# Patient Record
Sex: FEMALE | Race: WHITE | NOT HISPANIC OR LATINO | Employment: OTHER | URBAN - METROPOLITAN AREA
[De-identification: names, ages, dates, MRNs, and addresses within clinical notes are randomized per-mention and may not be internally consistent; named-entity substitution may affect disease eponyms.]

---

## 2017-01-23 ENCOUNTER — HOSPITAL ENCOUNTER (OUTPATIENT)
Dept: ULTRASOUND IMAGING | Facility: CLINIC | Age: 42
Discharge: HOME | End: 2017-01-23
Attending: UROLOGY | Admitting: UROLOGY
Payer: COMMERCIAL

## 2017-01-23 DIAGNOSIS — R31.9 HEMATURIA: ICD-10-CM

## 2017-01-23 DIAGNOSIS — N13.30 BILATERAL HYDRONEPHROSIS: ICD-10-CM

## 2017-01-23 PROCEDURE — 76770 US EXAM ABDO BACK WALL COMP: CPT

## 2017-01-24 DIAGNOSIS — R07.89 CHEST WALL PAIN: Primary | ICD-10-CM

## 2017-01-24 NOTE — TELEPHONE ENCOUNTER
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    Controlled Substance Refill Request for Xanan  Problem List Complete:  No     PROVIDER TO CONSIDER COMPLETION OF PROBLEM LIST AND OVERVIEW/CONTROLLED SUBSTANCE AGREEMENT    Last Written Prescription Date:  11/13/2015  Last Fill Quantity: 20,   # refills: 0    Last Office Visit with [facility] primary care provider: 11/15/2016    Future Office visit:     Controlled substance agreement on file: No.     Processing:  Fax Rx to [facility] pharmacy   checked in past 6 months?  No, route to CONCETTA Barraza

## 2017-01-25 RX ORDER — ALPRAZOLAM 0.5 MG
0.5 TABLET ORAL 3 TIMES DAILY PRN
Qty: 3 TABLET | Refills: 0 | Status: SHIPPED | OUTPATIENT
Start: 2017-01-25 | End: 2017-02-21

## 2017-02-21 ENCOUNTER — OFFICE VISIT (OUTPATIENT)
Dept: UROLOGY | Facility: CLINIC | Age: 42
End: 2017-02-21
Payer: COMMERCIAL

## 2017-02-21 VITALS
DIASTOLIC BLOOD PRESSURE: 60 MMHG | SYSTOLIC BLOOD PRESSURE: 100 MMHG | WEIGHT: 145 LBS | BODY MASS INDEX: 24.75 KG/M2 | HEIGHT: 64 IN

## 2017-02-21 DIAGNOSIS — N13.30 HYDRONEPHROSIS, UNSPECIFIED HYDRONEPHROSIS TYPE: Primary | ICD-10-CM

## 2017-02-21 PROCEDURE — 99207 ZZC NO CHARGE LOS: CPT | Performed by: UROLOGY

## 2017-02-21 ASSESSMENT — PAIN SCALES - GENERAL: PAINLEVEL: NO PAIN (0)

## 2017-02-21 NOTE — LETTER
2/21/2017       RE: Citlali Adrian  [id number]  [place] [address]     Dear Colleague,    Thank you for referring your patient, Citlali Adrian, to the [facility] [place] at [facility]. Please see a copy of my visit note below.    Had consult via phone with the patient.   Mild hydro, unchanged in u/s.   Patient will follow up in one year with u/s/           Again, thank you for allowing me to participate in the care of your patient.      Sincerely,    Aletha Lorenzo MD

## 2017-02-21 NOTE — MR AVS SNAPSHOT
"              After Visit Summary   2/21/2017    Citlali Adrian    MRN: [id number]           Patient Information     Date Of Birth          1975        Visit Information        Provider Department      2/21/2017 1:00 PM Aletha Lorenzo MD [facility] [place]        Today's Diagnoses     Hydronephrosis, unspecified hydronephrosis type    -  1       Follow-ups after your visit        Follow-up notes from your care team     Return in about 1 year (around 2/21/2018).      Who to contact     If you have questions or need follow up information about today's clinic visit or your schedule please contact [facility] directly at [phone].  Normal or non-critical lab and imaging results will be communicated to you by [employer], letter or phone within 4 business days after the clinic has received the results. If you do not hear from us within 7 days, please contact the clinic through [employer] or phone. If you have a critical or abnormal lab result, we will notify you by phone as soon as possible.  Submit refill requests through [employer] or call your pharmacy and they will forward the refill request to us. Please allow 3 business days for your refill to be completed.          Additional Information About Your Visit        MyChart Information     [employer] gives you secure access to your electronic health record. If you see a primary care provider, you can also send messages to your care team and make appointments. If you have questions, please call your primary care clinic.  If you do not have a primary care provider, please call [phone] and they will assist you.        Care EveryWhere ID     This is your Care EveryWhere ID. This could be used by other organizations to access your [place] medical records  [id number]        Your Vitals Were     Height BMI (Body Mass Index)                1.626 m (5' 4\") 24.89 kg/m2           Blood Pressure " from Last 3 Encounters:   02/21/17 100/60   11/15/16 114/72   02/19/16 104/71    Weight from Last 3 Encounters:   02/21/17 65.8 kg (145 lb)   11/15/16 68.9 kg (152 lb)   11/23/15 66.7 kg (147 lb)              Today, you had the following     No orders found for display       Primary Care Provider Office Phone # Fax #    Kiki Heidi Mariano PA-C [phone]       [address] [address]        Thank you!     Thank you for choosing [facility] [place]  for your care. Our goal is always to provide you with excellent care. Hearing back from our patients is one way we can continue to improve our services. Please take a few minutes to complete the written survey that you may receive in the mail after your visit with us. Thank you!             Your Updated Medication List - Protect others around you: Learn how to safely use, store and throw away your medicines at www.disposemymeds.org.          This list is accurate as of: 2/21/17  5:59 PM.  Always use your most recent med list.                   Brand Name Dispense Instructions for use    clobetasol 0.05 % ointment    TEMOVATE    45 g    Apply sparingly to affected area twice daily as needed.  Do not apply to face.

## 2017-02-21 NOTE — PROGRESS NOTES
Had consult via phone with the patient.   Mild hydro, unchanged in u/s.   Patient will follow up in one year with u/s/

## 2017-04-13 ENCOUNTER — MYC MEDICAL ADVICE (OUTPATIENT)
Dept: FAMILY MEDICINE | Facility: CLINIC | Age: 42
End: 2017-04-13

## 2017-04-13 ENCOUNTER — E-VISIT (OUTPATIENT)
Dept: FAMILY MEDICINE | Facility: CLINIC | Age: 42
End: 2017-04-13
Payer: COMMERCIAL

## 2017-04-13 ENCOUNTER — TELEPHONE (OUTPATIENT)
Dept: FAMILY MEDICINE | Facility: CLINIC | Age: 42
End: 2017-04-13

## 2017-04-13 DIAGNOSIS — J01.00 ACUTE NON-RECURRENT MAXILLARY SINUSITIS: Primary | ICD-10-CM

## 2017-04-13 PROCEDURE — 98969 ZZC NONPHYSICIAN ONLINE ASSESSMENT AND MANAGEMENT: CPT | Performed by: NURSE PRACTITIONER

## 2017-04-13 RX ORDER — FLUTICASONE PROPIONATE 50 MCG
1-2 SPRAY, SUSPENSION (ML) NASAL DAILY
Qty: 1 BOTTLE | Refills: 11 | Status: SHIPPED | OUTPATIENT
Start: 2017-04-13 | End: 2017-04-18

## 2017-04-13 NOTE — TELEPHONE ENCOUNTER
Pt calls, discussed below at length, will initiate E visit  Delmy Claire RN, BSN  Message handled by Nurse Triage.

## 2017-04-13 NOTE — TELEPHONE ENCOUNTER
RN Sinusitis Treatment Protocol: ages 18 and up  Citlali Adrian, a 41 year old female, is having symptoms reviewed for possible sinus infection.    SUBJECTIVE:  Symptoms: Facial pain or pressure over the sinus areas, especially if worse with position change or cough, Nasal discharge/purulent, Nasal congestion, Post nasal drip and present only 5 days     In addition notes: None   Shortness of breath: NO  Onset of symptoms was 5 day(s) ago.    Treatment measures tried include Tylenol only.   Course of illness is worsening.  Predisposing conditions include: None    Complicating Factors and Serious Symptoms:   Patient reports: NONE   Patient denies: NONE    ALLERGIES:   Allergies   Allergen Reactions     No Known Allergies          #  [phone]      Melinda Beaver RN

## 2017-04-13 NOTE — TELEPHONE ENCOUNTER
Please call pt back.   I recommend a visit--either office visit/urgent care, Evisit through MyChart or Zipnosis.   Patient likely needs antibiotics.     Kiki Mariano PA-C

## 2017-04-18 ENCOUNTER — OFFICE VISIT (OUTPATIENT)
Dept: FAMILY MEDICINE | Facility: CLINIC | Age: 42
End: 2017-04-18
Payer: COMMERCIAL

## 2017-04-18 VITALS
DIASTOLIC BLOOD PRESSURE: 76 MMHG | SYSTOLIC BLOOD PRESSURE: 118 MMHG | TEMPERATURE: 97.8 F | BODY MASS INDEX: 25.95 KG/M2 | OXYGEN SATURATION: 98 % | RESPIRATION RATE: 14 BRPM | HEART RATE: 68 BPM | WEIGHT: 152 LBS | HEIGHT: 64 IN

## 2017-04-18 DIAGNOSIS — J01.90 ACUTE SINUSITIS WITH SYMPTOMS > 10 DAYS: Primary | ICD-10-CM

## 2017-04-18 PROCEDURE — 99213 OFFICE O/P EST LOW 20 MIN: CPT | Performed by: NURSE PRACTITIONER

## 2017-04-18 RX ORDER — CEFDINIR 300 MG/1
300 CAPSULE ORAL 2 TIMES DAILY
Qty: 20 CAPSULE | Refills: 0 | Status: SHIPPED | OUTPATIENT
Start: 2017-04-18 | End: 2017-04-28

## 2017-04-18 NOTE — PATIENT INSTRUCTIONS
Stop Augmentin.  Start Cefdinir (Omnicef) twice daily x 10 days.  Increase fluids.  Okay to use Sudafed as needed for congestion.

## 2017-04-18 NOTE — NURSING NOTE
"Chief Complaint   Patient presents with     Sinus Problem       Initial /76 (BP Location: Right arm, Cuff Size: Adult Regular)  Pulse 68  Temp 97.8  F (36.6  C) (Oral)  Resp 14  Ht 5' 4\" (1.626 m)  Wt 152 lb (68.9 kg)  LMP 03/21/2017 (Approximate)  SpO2 98%  BMI 26.09 kg/m2 Estimated body mass index is 26.09 kg/(m^2) as calculated from the following:    Height as of this encounter: 5' 4\" (1.626 m).    Weight as of this encounter: 152 lb (68.9 kg).  Medication Reconciliation: complete   Hilaria Grant MA    "

## 2017-04-18 NOTE — PROGRESS NOTES
HPI    SUBJECTIVE:                                                    Citlali Adrian is a 41 year old female who presents to clinic today for the following health issues:      Acute Illness   Acute illness concerns: sinus problems   Onset: started 10 days ago. Had an e-visit on 4/13/17    Fever: no    Chills/Sweats: YES- sweats    Headache (location?): YES    Sinus Pressure:YES    Conjunctivitis:  no    Ear Pain: YES: both ears. Pressure    Rhinorrhea: no-eyes,nose, mouth feel very dry now.     Congestion: YES    Sore Throat: no     Cough: sometimes    Wheeze: no    Decreased Appetite: no    Nausea: no    Vomiting: no    Diarrhea:  no    Dysuria/Freq.: no    Fatigue/Achiness: no    Sick/Strep Exposure:  had the flu 4/6/17     Therapies Tried and outcome: augmentin from e-visit.     Continues to feel congested.  Nasal drainage has decreased a lot.  Feeling some better, but feels her symptoms should be improved more at this point on antibiotics.  Not using flonase; does not like nasal sprays.  Always been treated with zithromax for sinusitis and had good relief.  Would like a z-na.           Problem list and histories reviewed & adjusted, as indicated.  Additional history: as documented.    Current Outpatient Prescriptions   Medication Sig Dispense Refill     fluconazole (DIFLUCAN) 150 MG tablet Take 1 tablet (150 mg) by mouth every 3 days 4 tablet 0     amoxicillin-clavulanate (AUGMENTIN) 875-125 MG per tablet Take 1 tablet by mouth 2 times daily 20 tablet 0     clobetasol (TEMOVATE) 0.05 % ointment Apply sparingly to affected area twice daily as needed.  Do not apply to face. 45 g 1     Allergies   Allergen Reactions     No Known Allergies        Reviewed and updated as needed this visit by clinical staff  Tobacco  Allergies  Meds  Problems  Med Hx  Soc Hx      Reviewed and updated as needed this visit by Provider         ROS:  Constitutional, HEENT, cardiovascular, pulmonary, gi and gu systems are  "negative, except as otherwise noted.    OBJECTIVE:                                                    /76 (BP Location: Right arm, Cuff Size: Adult Regular)  Pulse 68  Temp 97.8  F (36.6  C) (Oral)  Resp 14  Ht 5' 4\" (1.626 m)  Wt 152 lb (68.9 kg)  LMP 03/21/2017 (Approximate)  SpO2 98%  BMI 26.09 kg/m2  Body mass index is 26.09 kg/(m^2).  GENERAL: healthy, alert and no distress  HENT: ear canals and TM's normal, nasal mucosa erythematous and moderately congested, and mouth without ulcers or lesions, no sinus tenderness  NECK: no adenopathy, no asymmetry, masses, or scars and thyroid normal to palpation  RESP: lungs clear to auscultation - no rales, rhonchi or wheezes  CV: regular rate and rhythm, normal S1 S2, no S3 or S4, no murmur, click or rub, no peripheral edema and peripheral pulses strong  SKIN: no suspicious lesions or rashes  PSYCH: mentation appears normal, affect normal/bright    Diagnostic Test Results:  none      ASSESSMENT/PLAN:                                                    1. Acute sinusitis with symptoms > 10 days  Discussed medications at length.  Will stop augmentin and start cefdinir.  OTC decongestants as needed.  If no improvement in 3-5 days or worsening symptoms return for follow up.    - cefdinir (OMNICEF) 300 MG capsule; Take 1 capsule (300 mg) by mouth 2 times daily for 10 days  Dispense: 20 capsule; Refill: 0    - She is aware she is due for a pap; will make appt.         ANN Chapman [facility]      Physical Exam      "

## 2017-04-18 NOTE — MR AVS SNAPSHOT
After Visit Summary   4/18/2017    Citlali Adrian    MRN: [id number]           Patient Information     Date Of Birth          1975        Visit Information        Provider Department      4/18/2017 3:40 PM Janeth Rodriguez APRN CNP [facility]        Today's Diagnoses     Acute sinusitis with symptoms > 10 days    -  1      Care Instructions    Stop Augmentin.  Start Cefdinir (Omnicef) twice daily x 10 days.  Increase fluids.  Okay to use Sudafed as needed for congestion.          Follow-ups after your visit        Who to contact     If you have questions or need follow up information about today's clinic visit or your schedule please contact [facility] directly at [phone].  Normal or non-critical lab and imaging results will be communicated to you by [employer], letter or phone within 4 business days after the clinic has received the results. If you do not hear from us within 7 days, please contact the clinic through [employer] or phone. If you have a critical or abnormal lab result, we will notify you by phone as soon as possible.  Submit refill requests through [employer] or call your pharmacy and they will forward the refill request to us. Please allow 3 business days for your refill to be completed.          Additional Information About Your Visit        MyChart Information     [employer] gives you secure access to your electronic health record. If you see a primary care provider, you can also send messages to your care team and make appointments. If you have questions, please call your primary care clinic.  If you do not have a primary care provider, please call [phone] and they will assist you.        Care EveryWhere ID     This is your Care EveryWhere ID. This could be used by other organizations to access your [place] medical records  [id number]        Your Vitals Were     Pulse Temperature Respirations Height Last Period Pulse Oximetry    68 97.8  F  "(36.6  C) (Oral) 14 5' 4\" (1.626 m) 03/21/2017 (Approximate) 98%    BMI (Body Mass Index)                   26.09 kg/m2            Blood Pressure from Last 3 Encounters:   04/18/17 118/76   02/21/17 100/60   11/15/16 114/72    Weight from Last 3 Encounters:   04/18/17 152 lb (68.9 kg)   02/21/17 145 lb (65.8 kg)   11/15/16 152 lb (68.9 kg)              Today, you had the following     No orders found for display         Today's Medication Changes          These changes are accurate as of: 4/18/17  4:26 PM.  If you have any questions, ask your nurse or doctor.               Start taking these medicines.        Dose/Directions    cefdinir 300 MG capsule   Commonly known as:  OMNICEF   Used for:  Acute sinusitis with symptoms > 10 days   Started by:  Janeth Rodriguez APRN CNP        Dose:  300 mg   Take 1 capsule (300 mg) by mouth 2 times daily for 10 days   Quantity:  20 capsule   Refills:  0            Where to get your medicines      These medications were sent to [id number] [address] [place], [place] [address]     Phone:  [phone]     cefdinir 300 MG capsule                Primary Care Provider Office Phone # Fax #    Susan Rachele Haase, APRN [phone]       [address] [address]        Thank you!     Thank you for choosing [facility]  for your care. Our goal is always to provide you with excellent care. Hearing back from our patients is one way we can continue to improve our services. Please take a few minutes to complete the written survey that you may receive in the mail after your visit with us. Thank you!             Your Updated Medication List - Protect others around you: Learn how to safely use, store and throw away your medicines at www.disposemymeds.org.          This list is accurate as of: 4/18/17  4:26 PM.  Always use your most recent med list.                   " Brand Name Dispense Instructions for use    amoxicillin-clavulanate 875-125 MG per tablet    AUGMENTIN    20 tablet    Take 1 tablet by mouth 2 times daily       cefdinir 300 MG capsule    OMNICEF    20 capsule    Take 1 capsule (300 mg) by mouth 2 times daily for 10 days       clobetasol 0.05 % ointment    TEMOVATE    45 g    Apply sparingly to affected area twice daily as needed.  Do not apply to face.       fluconazole 150 MG tablet    DIFLUCAN    4 tablet    Take 1 tablet (150 mg) by mouth every 3 days

## 2017-04-21 ENCOUNTER — TELEPHONE (OUTPATIENT)
Dept: FAMILY MEDICINE | Facility: CLINIC | Age: 42
End: 2017-04-21

## 2017-04-28 ENCOUNTER — TRANSFERRED RECORDS (OUTPATIENT)
Dept: HEALTH INFORMATION MANAGEMENT | Facility: CLINIC | Age: 42
End: 2017-04-28

## 2017-04-28 LAB — PAP SMEAR - HIM PATIENT REPORTED: NEGATIVE

## 2017-05-23 PROBLEM — N13.30 HYDRONEPHROSIS, UNSPECIFIED HYDRONEPHROSIS TYPE: Status: ACTIVE | Noted: 2017-05-23

## 2017-05-24 ENCOUNTER — OFFICE VISIT (OUTPATIENT)
Dept: FAMILY MEDICINE | Facility: CLINIC | Age: 42
End: 2017-05-24
Payer: COMMERCIAL

## 2017-05-24 VITALS
DIASTOLIC BLOOD PRESSURE: 70 MMHG | BODY MASS INDEX: 25.78 KG/M2 | WEIGHT: 151 LBS | HEART RATE: 82 BPM | RESPIRATION RATE: 12 BRPM | HEIGHT: 64 IN | OXYGEN SATURATION: 98 % | SYSTOLIC BLOOD PRESSURE: 104 MMHG | TEMPERATURE: 98.2 F

## 2017-05-24 DIAGNOSIS — Z00.00 ROUTINE GENERAL MEDICAL EXAMINATION AT A HEALTH CARE FACILITY: Primary | ICD-10-CM

## 2017-05-24 DIAGNOSIS — Z13.6 CARDIOVASCULAR SCREENING; LDL GOAL LESS THAN 160: ICD-10-CM

## 2017-05-24 LAB
BASOPHILS # BLD AUTO: 0 10E9/L (ref 0–0.2)
BASOPHILS NFR BLD AUTO: 0.5 %
DIFFERENTIAL METHOD BLD: NORMAL
EOSINOPHIL # BLD AUTO: 0.1 10E9/L (ref 0–0.7)
EOSINOPHIL NFR BLD AUTO: 1.5 %
ERYTHROCYTE [DISTWIDTH] IN BLOOD BY AUTOMATED COUNT: 13.1 % (ref 10–15)
HCT VFR BLD AUTO: 37.1 % (ref 35–47)
HGB BLD-MCNC: 12.5 G/DL (ref 11.7–15.7)
LYMPHOCYTES # BLD AUTO: 1.3 10E9/L (ref 0.8–5.3)
LYMPHOCYTES NFR BLD AUTO: 21.1 %
MCH RBC QN AUTO: 29.6 PG (ref 26.5–33)
MCHC RBC AUTO-ENTMCNC: 33.7 G/DL (ref 31.5–36.5)
MCV RBC AUTO: 88 FL (ref 78–100)
MONOCYTES # BLD AUTO: 0.4 10E9/L (ref 0–1.3)
MONOCYTES NFR BLD AUTO: 7 %
NEUTROPHILS # BLD AUTO: 4.3 10E9/L (ref 1.6–8.3)
NEUTROPHILS NFR BLD AUTO: 69.9 %
PLATELET # BLD AUTO: 282 10E9/L (ref 150–450)
RBC # BLD AUTO: 4.22 10E12/L (ref 3.8–5.2)
WBC # BLD AUTO: 6.1 10E9/L (ref 4–11)

## 2017-05-24 PROCEDURE — 90471 IMMUNIZATION ADMIN: CPT | Performed by: NURSE PRACTITIONER

## 2017-05-24 PROCEDURE — 80061 LIPID PANEL: CPT | Performed by: NURSE PRACTITIONER

## 2017-05-24 PROCEDURE — 80050 GENERAL HEALTH PANEL: CPT | Performed by: NURSE PRACTITIONER

## 2017-05-24 PROCEDURE — 83550 IRON BINDING TEST: CPT | Performed by: NURSE PRACTITIONER

## 2017-05-24 PROCEDURE — 90715 TDAP VACCINE 7 YRS/> IM: CPT | Performed by: NURSE PRACTITIONER

## 2017-05-24 PROCEDURE — 82306 VITAMIN D 25 HYDROXY: CPT | Performed by: NURSE PRACTITIONER

## 2017-05-24 PROCEDURE — 99396 PREV VISIT EST AGE 40-64: CPT | Mod: 25 | Performed by: NURSE PRACTITIONER

## 2017-05-24 PROCEDURE — 36415 COLL VENOUS BLD VENIPUNCTURE: CPT | Performed by: NURSE PRACTITIONER

## 2017-05-24 PROCEDURE — 83540 ASSAY OF IRON: CPT | Performed by: NURSE PRACTITIONER

## 2017-05-24 NOTE — NURSING NOTE
"Chief Complaint   Patient presents with     Physical       Initial /70 (BP Location: Left arm, Patient Position: Chair, Cuff Size: Adult Regular)  Pulse 82  Temp 98.2  F (36.8  C) (Oral)  Resp 12  Ht 5' 4\" (1.626 m)  Wt 151 lb (68.5 kg)  SpO2 98%  BMI 25.92 kg/m2 Estimated body mass index is 25.92 kg/(m^2) as calculated from the following:    Height as of this encounter: 5' 4\" (1.626 m).    Weight as of this encounter: 151 lb (68.5 kg).  Medication Reconciliation: complete   Keily Bullock CMA      "

## 2017-05-24 NOTE — Clinical Note
Please abstract the following data from this visit with this patient into the appropriate field in Epic:  Pap smear done on this date: 4/28/2017 (approximately), by this group: [facility], results were normal.

## 2017-05-24 NOTE — MR AVS SNAPSHOT
After Visit Summary   5/24/2017    Citlali Adrian    MRN: [id number]           Patient Information     Date Of Birth          1975        Visit Information        Provider Department      5/24/2017 8:00 AM Haase, Susan Rachele, APRN [facility]        Today's Diagnoses     Routine general medical examination at a health care facility    -  1    CARDIOVASCULAR SCREENING; LDL GOAL LESS THAN 160          Care Instructions      Preventive Health Recommendations  Female Ages 40 to 49    Yearly exam:     See your health care provider every year in order to  1. Review health changes.   2. Discuss preventive care.    3. Review your medicines if your doctor prescribed any.      Get a Pap test every three years (unless you have an abnormal result and your provider advises testing more often).      If you get Pap tests with HPV test, you only need to test every 5 years, unless you have an abnormal result. You do not need a Pap test if your uterus was removed (hysterectomy) and you have not had cancer.      You should be tested each year for STDs (sexually transmitted diseases), if you're at risk.       Ask your doctor if you should have a mammogram.      Have a colonoscopy (test for colon cancer) if someone in your family has had colon cancer or polyps before age 50.       Have a cholesterol test every 5 years.       Have a diabetes test (fasting glucose) after age 45. If you are at risk for diabetes, you should have this test every 3 years.    Shots: Get a flu shot each year. Get a tetanus shot every 10 years.     Nutrition:     Eat at least 5 servings of fruits and vegetables each day.    Eat whole-grain bread, whole-wheat pasta and brown rice instead of white grains and rice.    Talk to your provider about Calcium and Vitamin D.     Lifestyle    Exercise at least 150 minutes a week (an average of 30 minutes a day, 5 days a week). This will help you control your weight and prevent  disease.    Limit alcohol to one drink per day.    No smoking.     Wear sunscreen to prevent skin cancer.    See your dentist every six months for an exam and cleaning.          Follow-ups after your visit        Follow-up notes from your care team     Return in about 1 year (around 5/24/2018) for Physical Exam.      Your next 10 appointments already scheduled     Jul 24, 2017 12:30 PM CDT   MA SCREENING DIGITAL BILATERAL with [id number]   [facility] ([facility])    [address] [facility], [address]  [facility] [id number] [phone]           Do not use any powder, lotion or deodorant under your arms or on your breast. If you do, we will ask you to remove it before your exam.  Wear comfortable, two-piece clothing.  If you have any allergies, tell your care team.  Bring any previous mammograms from other facilities or have them mailed to the breast center. This mammogram location, [facility], now offers 3D mammography. It doesn't replace a screening mammogram and can be done with a regular screening mammogram. It is optional and not all insurances will pay for it. 3D mammography is a special kind of mammogram that produces a three-dimensional image of the breast by using low dose-xrays. 3D allows the radiologist to see the breast tissue differently from 2D, which reduces the chance of repeat testing due to overlapping breast tissue. If you are interested in have a 3D mammogram, please check with your insurance before you arrive for your exam. On the day of your exam you will be asked if you would like 3D imaging.              Who to contact     If you have questions or need follow up information about today's clinic visit or your schedule please contact [facility] directly at [phone].  Normal or non-critical lab and imaging results will be communicated to you by MyChart, letter or phone within 4 business days after the clinic has received the  "results. If you do not hear from us within 7 days, please contact the clinic through [employer] or phone. If you have a critical or abnormal lab result, we will notify you by phone as soon as possible.  Submit refill requests through [employer] or call your pharmacy and they will forward the refill request to us. Please allow 3 business days for your refill to be completed.          Additional Information About Your Visit        [employer] Information     [employer] gives you secure access to your electronic health record. If you see a primary care provider, you can also send messages to your care team and make appointments. If you have questions, please call your primary care clinic.  If you do not have a primary care provider, please call [phone] and they will assist you.        Care EveryWhere ID     This is your Care EveryWhere ID. This could be used by other organizations to access your [place] medical records  [id number]        Your Vitals Were     Pulse Temperature Respirations Height Pulse Oximetry BMI (Body Mass Index)    82 98.2  F (36.8  C) (Oral) 12 5' 4\" (1.626 m) 98% 25.92 kg/m2       Blood Pressure from Last 3 Encounters:   05/24/17 104/70   04/18/17 118/76   02/21/17 100/60    Weight from Last 3 Encounters:   05/24/17 151 lb (68.5 kg)   04/18/17 152 lb (68.9 kg)   02/21/17 145 lb (65.8 kg)              We Performed the Following     CBC with platelets differential     Comprehensive metabolic panel     Iron and iron binding capacity     Lipid panel reflex to direct LDL     TDAP VACCINE (ADACEL)     TSH with free T4 reflex     Vitamin D Deficiency        Primary Care Provider Office Phone # Fax #    Susan Rachele Haase, APRN [phone]       [facility] [id number] [place] [id number]        Thank you!     Thank you for choosing [facility]  for your care. Our goal is always to provide you with excellent care. Hearing back from our patients is " one way we can continue to improve our services. Please take a few minutes to complete the written survey that you may receive in the mail after your visit with us. Thank you!             Your Updated Medication List - Protect others around you: Learn how to safely use, store and throw away your medicines at www.disposemymeds.org.          This list is accurate as of: 5/24/17  8:30 AM.  Always use your most recent med list.                   Brand Name Dispense Instructions for use    clobetasol 0.05 % ointment    TEMOVATE    45 g    Apply sparingly to affected area twice daily as needed.  Do not apply to face.

## 2017-05-24 NOTE — PROGRESS NOTES
"   SUBJECTIVE:     CC: Citlali Adrian is an 41 year old woman who presents for preventive health visit.     Healthy Habits:    Do you get at least three servings of calcium containing foods daily (dairy, green leafy vegetables, etc.)? yes    Amount of exercise or daily activities, outside of work: 3-4 day(s) per week    Problems taking medications regularly No    Medication side effects: No    Have you had an eye exam in the past two years? yes    Do you see a dentist twice per year? Yes    Do you have sleep apnea, excessive snoring or daytime drowsiness?no    Health Maintenance -- Will update Tetanus today.       Mammogram -- last in 7/2016 was normal, FHx of breast cancer (MGMo), recommended every 1-2 years - due by 7/2018    Pap Smear -- last on 4/28/2017 completed by [facility], patient reported normal results, recommended every 3 years - due 4/2020   Colonoscopy -- last in 5/2011  Psoriasis -- No concerns at this time. Followed by dermatology. Using clobetasol (0.05%) ointment prn. Of note, patient reports that she has a mole removed from L wrist in the past and biopsy results suggest borderline for melanoma. Has annual skin checks by dermatology    Pain of L Anterior Chest -- Intermittent pain of L side of anterior chest. Located inferior to L clavicle and superior to L breast. Describes as sharp and feels like musculature \"catches\" deep to L breast tissue. States that her pain is not related to or worsened by inhalation/exhalation. Denies SOB, heart burn, palpitations, or breast tenderness.    Hydronephrosis -- Followed by urology yearly.    Today's PHQ-2 Score:   PHQ-2 ( 1999 Pfizer) 5/24/2017 11/15/2016   Q1: Little interest or pleasure in doing things 0 0   Q2: Feeling down, depressed or hopeless 0 0   PHQ-2 Score 0 0     Abuse: Current or Past(Physical, Sexual or Emotional)- No  Do you feel safe in your environment - Yes    Social History   Substance Use Topics     Smoking status: Never Smoker     " "Smokeless tobacco: Never Used     Alcohol use Yes      Comment: socially     The patient does not drink >3 drinks per day nor >7 drinks per week.    Recent Labs   Lab Test  06/14/14   0759  02/20/13   0908   CHOL  121  126   HDL  44*  46*   LDL  57  62   TRIG  98  88   CHOLHDLRATIO  2.7  2.7     Reviewed orders with patient.  Reviewed health maintenance and updated orders accordingly - Yes    Mammo Decision Support:  Patient under age 50, mutual decision reflected in health maintenance.    Pertinent mammograms are reviewed under the imaging tab.  History of abnormal Pap smear: NO - age 30- 65 PAP every 3 years recommended    Reviewed and updated as needed this visit by clinical staff  Tobacco  Allergies  Med Hx  Surg Hx  Fam Hx  Soc Hx      Reviewed and updated as needed this visit by Provider    Past Medical History:   Diagnosis Date     Microscopic Hematuria     neg IVP 98 and neg US 97     Psoriasis     mild      ROS:  Constitutional, HEENT, cardiovascular, pulmonary, GI, , musculoskeletal, neuro, skin, endocrine and psych systems are negative, except as in HPI or otherwise noted     This document serves as a record of the services and decisions personally performed and made by Susan Haase, CNP. It was created on her behalf by Buffy Gurrola, a trained medical scribe. The creation of this document is based the provider's statements to the medical scribe.  Buffy Gurrola May 24, 2017 8:22 AM     Problem list, Medication list, Allergies, and Medical/Social/Surgical histories reviewed in [facility] and updated as appropriate.  OBJECTIVE:     /70 (BP Location: Left arm, Patient Position: Chair, Cuff Size: Adult Regular)  Pulse 82  Temp 98.2  F (36.8  C) (Oral)  Resp 12  Ht 1.626 m (5' 4\")  Wt 68.5 kg (151 lb)  SpO2 98%  BMI 25.92 kg/m2  EXAM:  GENERAL: healthy, alert and no distress  EYES: Eyes grossly normal to inspection  HENT: ear canals and TM's normal, nose and mouth without ulcers or " "lesions  NECK: no adenopathy, no asymmetry, masses, or scars and thyroid normal to palpation  RESP: lungs clear to auscultation - no rales, rhonchi or wheezes  BREAST: defer, followed by GYN  CV: regular rate and rhythm, normal S1 S2, no S3 or S4, no murmur, click or rub, no peripheral edema  ABDOMEN: soft, nontender, no hepatosplenomegaly, no masses and bowel sounds normal  MS: no gross musculoskeletal defects noted, no edema  SKIN: no suspicious lesions or rashes  NEURO: Normal strength and tone, mentation intact and speech normal  PSYCH: mentation appears normal, affect normal/bright    ASSESSMENT/PLAN:     Citlali was seen today for physical.    Diagnoses and all orders for this visit:    Routine general medical examination at a health care facility  -     TDAP VACCINE (ADACEL)  -     CBC with platelets differential  -     Comprehensive metabolic panel  -     -     TSH with free T4 reflex  -     Vitamin D Deficiency  -     Iron and iron binding capacity    CARDIOVASCULAR SCREENING; LDL GOAL LESS THAN 160  Lipid panel reflex to direct LDL    COUNSELING:   Reviewed preventive health counseling, as reflected in patient instructions     reports that she has never smoked. She has never used smokeless tobacco.    Estimated body mass index is 25.92 kg/(m^2) as calculated from the following:    Height as of this encounter: 1.626 m (5' 4\").    Weight as of this encounter: 68.5 kg (151 lb).     Counseling Resources:  ATP IV Guidelines  Pooled Cohorts Equation Calculator  Breast Cancer Risk Calculator  FRAX Risk Assessment  ICSI Preventive Guidelines  Dietary Guidelines for Americans, 2010  USDA's MyPlate  ASA Prophylaxis  Lung CA Screening  Follow up in 1 year, sooner as needed.  The information in this document, created by the medical scribe for me, accurately reflects the services I personally performed and the decisions made by me. I have reviewed and approved this document for accuracy.   Susan Haase, APRN [facility] " [facility]

## 2017-05-24 NOTE — PROGRESS NOTES
Des Godwin,  Your CBC (checks for anemia and infection) was normal.  Sincerely,     Susan Haase, CNP

## 2017-05-25 LAB
ALBUMIN SERPL-MCNC: 4.3 G/DL (ref 3.4–5)
ALP SERPL-CCNC: 50 U/L (ref 40–150)
ALT SERPL W P-5'-P-CCNC: 18 U/L (ref 0–50)
ANION GAP SERPL CALCULATED.3IONS-SCNC: 9 MMOL/L (ref 3–14)
AST SERPL W P-5'-P-CCNC: 11 U/L (ref 0–45)
BILIRUB SERPL-MCNC: 0.5 MG/DL (ref 0.2–1.3)
BUN SERPL-MCNC: 14 MG/DL (ref 7–30)
CALCIUM SERPL-MCNC: 9.6 MG/DL (ref 8.5–10.1)
CHLORIDE SERPL-SCNC: 106 MMOL/L (ref 94–109)
CHOLEST SERPL-MCNC: 155 MG/DL
CO2 SERPL-SCNC: 25 MMOL/L (ref 20–32)
CREAT SERPL-MCNC: 0.68 MG/DL (ref 0.52–1.04)
DEPRECATED CALCIDIOL+CALCIFEROL SERPL-MC: 30 UG/L (ref 20–75)
GFR SERPL CREATININE-BSD FRML MDRD: NORMAL ML/MIN/1.7M2
GLUCOSE SERPL-MCNC: 92 MG/DL (ref 70–99)
HDLC SERPL-MCNC: 54 MG/DL
IRON SATN MFR SERPL: 23 % (ref 15–46)
IRON SERPL-MCNC: 71 UG/DL (ref 35–180)
LDLC SERPL CALC-MCNC: 84 MG/DL
NONHDLC SERPL-MCNC: 101 MG/DL
POTASSIUM SERPL-SCNC: 4 MMOL/L (ref 3.4–5.3)
PROT SERPL-MCNC: 7.5 G/DL (ref 6.8–8.8)
SODIUM SERPL-SCNC: 140 MMOL/L (ref 133–144)
TIBC SERPL-MCNC: 308 UG/DL (ref 240–430)
TRIGL SERPL-MCNC: 83 MG/DL
TSH SERPL DL<=0.005 MIU/L-ACNC: 1.97 MU/L (ref 0.4–4)

## 2017-05-25 NOTE — PROGRESS NOTES
Des Godwin,  Your lab results are as below:  1)  TSH (thyroid level) 1.97 which is normal (range 0.4-5)  2)  Cholesterol was normal at 155,  your LDL (bad cholesterol) and your HDL (good cholesterol) were also within normal range. Continue to follow a low cholesterol diet and we will recheck this in 1 year.  3)  Glucose was normal at 92 (normal fasting is <100).  4)  Iron levels were normal.  5)  Vitamin D level was normal at 30.      If you have any questions do not hesitate to call the clinic to discuss the results with me further.     Sincerely,    Susan Haase, CNP

## 2017-06-07 ENCOUNTER — OFFICE VISIT (OUTPATIENT)
Dept: FAMILY MEDICINE | Facility: CLINIC | Age: 42
End: 2017-06-07
Payer: COMMERCIAL

## 2017-06-07 VITALS
RESPIRATION RATE: 18 BRPM | HEART RATE: 92 BPM | BODY MASS INDEX: 25.23 KG/M2 | SYSTOLIC BLOOD PRESSURE: 120 MMHG | OXYGEN SATURATION: 98 % | WEIGHT: 147 LBS | TEMPERATURE: 98.4 F | DIASTOLIC BLOOD PRESSURE: 76 MMHG

## 2017-06-07 DIAGNOSIS — R07.0 THROAT PAIN: ICD-10-CM

## 2017-06-07 DIAGNOSIS — J06.9 VIRAL URI: Primary | ICD-10-CM

## 2017-06-07 LAB
DEPRECATED S PYO AG THROAT QL EIA: NORMAL
MICRO REPORT STATUS: NORMAL
SPECIMEN SOURCE: NORMAL

## 2017-06-07 PROCEDURE — 99213 OFFICE O/P EST LOW 20 MIN: CPT | Performed by: NURSE PRACTITIONER

## 2017-06-07 PROCEDURE — 87880 STREP A ASSAY W/OPTIC: CPT | Performed by: NURSE PRACTITIONER

## 2017-06-07 PROCEDURE — 87081 CULTURE SCREEN ONLY: CPT | Performed by: NURSE PRACTITIONER

## 2017-06-07 RX ORDER — CLOBETASOL PROPIONATE 0.05 G/ML
SPRAY TOPICAL
Refills: 2 | COMMUNITY
Start: 2017-05-19 | End: 2018-06-05

## 2017-06-07 NOTE — NURSING NOTE
"Chief Complaint   Patient presents with     Pharyngitis     Otalgia       Initial /76 (BP Location: Right arm, Cuff Size: Adult Regular)  Pulse 92  Temp 98.4  F (36.9  C) (Oral)  Resp 18  Wt 147 lb (66.7 kg)  LMP 05/12/2017  SpO2 98%  BMI 25.23 kg/m2 Estimated body mass index is 25.23 kg/(m^2) as calculated from the following:    Height as of 5/24/17: 5' 4\" (1.626 m).    Weight as of this encounter: 147 lb (66.7 kg).  Medication Reconciliation: complete   Hilaria Grant MA    "

## 2017-06-07 NOTE — MR AVS SNAPSHOT
After Visit Summary   6/7/2017    Citlali Adrian    MRN: [id number]           Patient Information     Date Of Birth          1975        Visit Information        Provider Department      6/7/2017 1:20 PM Janeth Rodriguez APRN [facility]        Today's Diagnoses     Viral URI    -  1    Throat pain          Care Instructions      Sudafed and flonase for congestion. If symptoms are improving in the next 5 days please let me know.     Viral Upper Respiratory Illness (Adult)  You have a viral upper respiratory illness (URI), which is another term for the common cold. This illness is contagious during the first few days. It is spread through the air by coughing and sneezing. It may also be spread by direct contact (touching the sick person and then touching your own eyes, nose, or mouth). Frequent handwashing will decrease risk of spread. Most viral illnesses go away within 7 to 10 days with rest and simple home remedies. Sometimes the illness may last for several weeks. Antibiotics will not kill a virus, and they are generally not prescribed for this condition.    Home care    If symptoms are severe, rest at home for the first 2 to 3 days. When you resume activity, don't let yourself get too tired.    Avoid being exposed to cigarette smoke (yours or others ).    You may use acetaminophen or ibuprofen to control pain and fever, unless another medicine was prescribed. (Note: If you have chronic liver or kidney disease, have ever had a stomach ulcer or gastrointestinal bleeding, or are taking blood-thinning medicines, talk with your healthcare provider before using these medicines.) Aspirin should never be given to anyone under 18 years of age who is ill with a viral infection or fever. It may cause severe liver or brain damage.    Your appetite may be poor, so a light diet is fine. Avoid dehydration by drinking 6 to 8 glasses of fluids per day (water, soft drinks, juices, tea,  or soup). Extra fluids will help loosen secretions in the nose and lungs.    Over-the-counter cold medicines will not shorten the length of time you re sick, but they may be helpful for the following symptoms: cough, sore throat, and nasal and sinus congestion. (Note: Do not use decongestants if you have high blood pressure.)  Follow-up care  Follow up with your healthcare provider, or as advised.  When to seek medical advice  Call your healthcare provider right away if any of these occur:    Cough with lots of colored sputum (mucus)    Severe headache; face, neck, or ear pain    Difficulty swallowing due to throat pain    Fever of 100.4 F (38 C)  Call 911, or get immediate medical care  Call emergency services right away if any of these occur:    Chest pain, shortness of breath, wheezing, or difficulty breathing    Coughing up blood    Inability to swallow due to throat pain    4934-1384 [employer]. [address] [place] [address]. All rights reserved. This information is not intended as a substitute for professional medical care. Always follow your healthcare professional's instructions.                Follow-ups after your visit        Your next 10 appointments already scheduled     Jul 24, 2017 12:30 PM CDT   MA SCREENING DIGITAL BILATERAL with [id number]   [facility] ([facility])    [address], [address]  [facility] [address] [phone]           Do not use any powder, lotion or deodorant under your arms or on your breast. If you do, we will ask you to remove it before your exam.  Wear comfortable, two-piece clothing.  If you have any allergies, tell your care team.  Bring any previous mammograms from other facilities or have them mailed to the breast center. This mammogram location, [facility], now offers 3D mammography. It doesn't replace a screening mammogram and can be done with a regular screening mammogram. It is optional  and not all insurances will pay for it. 3D mammography is a special kind of mammogram that produces a three-dimensional image of the breast by using low dose-xrays. 3D allows the radiologist to see the breast tissue differently from 2D, which reduces the chance of repeat testing due to overlapping breast tissue. If you are interested in have a 3D mammogram, please check with your insurance before you arrive for your exam. On the day of your exam you will be asked if you would like 3D imaging.              Who to contact     If you have questions or need follow up information about today's clinic visit or your schedule please contact [facility] directly at [phone].  Normal or non-critical lab and imaging results will be communicated to you by [employer], letter or phone within 4 business days after the clinic has received the results. If you do not hear from us within 7 days, please contact the clinic through [employer] or phone. If you have a critical or abnormal lab result, we will notify you by phone as soon as possible.  Submit refill requests through [employer] or call your pharmacy and they will forward the refill request to us. Please allow 3 business days for your refill to be completed.          Additional Information About Your Visit        [employer] Information     [employer] gives you secure access to your electronic health record. If you see a primary care provider, you can also send messages to your care team and make appointments. If you have questions, please call your primary care clinic.  If you do not have a primary care provider, please call [phone] and they will assist you.        Care EveryWhere ID     This is your Care EveryWhere ID. This could be used by other organizations to access your [facility] medical records  [id number]        Your Vitals Were     Pulse Temperature Respirations Last Period Pulse Oximetry BMI (Body Mass Index)    92 98.4  F (36.9  C) (Oral) 18 05/12/2017 98%  25.23 kg/m2       Blood Pressure from Last 3 Encounters:   06/07/17 120/76   05/24/17 104/70   04/18/17 118/76    Weight from Last 3 Encounters:   06/07/17 147 lb (66.7 kg)   05/24/17 151 lb (68.5 kg)   04/18/17 152 lb (68.9 kg)              We Performed the Following     Rapid strep screen        Primary Care Provider Office Phone # Fax #    Misty Rachele Haase, APRN [phone]       [facility] [id number] [place] [id number]        Thank you!     Thank you for choosing [facility]  for your care. Our goal is always to provide you with excellent care. Hearing back from our patients is one way we can continue to improve our services. Please take a few minutes to complete the written survey that you may receive in the mail after your visit with us. Thank you!             Your Updated Medication List - Protect others around you: Learn how to safely use, store and throw away your medicines at www.disposemymeds.org.          This list is accurate as of: 6/7/17  2:13 PM.  Always use your most recent med list.                   Brand Name Dispense Instructions for use    * clobetasol 0.05 % ointment    TEMOVATE    45 g    Apply sparingly to affected area twice daily as needed.  Do not apply to face.       * clobetasol propionate 0.05 % Liqd          * Notice:  This list has 2 medication(s) that are the same as other medications prescribed for you. Read the directions carefully, and ask your doctor or other care provider to review them with you.

## 2017-06-07 NOTE — PATIENT INSTRUCTIONS
Sudafed and flonase for congestion. If symptoms are improving in the next 5 days please let me know.     Viral Upper Respiratory Illness (Adult)  You have a viral upper respiratory illness (URI), which is another term for the common cold. This illness is contagious during the first few days. It is spread through the air by coughing and sneezing. It may also be spread by direct contact (touching the sick person and then touching your own eyes, nose, or mouth). Frequent handwashing will decrease risk of spread. Most viral illnesses go away within 7 to 10 days with rest and simple home remedies. Sometimes the illness may last for several weeks. Antibiotics will not kill a virus, and they are generally not prescribed for this condition.    Home care    If symptoms are severe, rest at home for the first 2 to 3 days. When you resume activity, don't let yourself get too tired.    Avoid being exposed to cigarette smoke (yours or others ).    You may use acetaminophen or ibuprofen to control pain and fever, unless another medicine was prescribed. (Note: If you have chronic liver or kidney disease, have ever had a stomach ulcer or gastrointestinal bleeding, or are taking blood-thinning medicines, talk with your healthcare provider before using these medicines.) Aspirin should never be given to anyone under 18 years of age who is ill with a viral infection or fever. It may cause severe liver or brain damage.    Your appetite may be poor, so a light diet is fine. Avoid dehydration by drinking 6 to 8 glasses of fluids per day (water, soft drinks, juices, tea, or soup). Extra fluids will help loosen secretions in the nose and lungs.    Over-the-counter cold medicines will not shorten the length of time you re sick, but they may be helpful for the following symptoms: cough, sore throat, and nasal and sinus congestion. (Note: Do not use decongestants if you have high blood pressure.)  Follow-up care  Follow up with your  healthcare provider, or as advised.  When to seek medical advice  Call your healthcare provider right away if any of these occur:    Cough with lots of colored sputum (mucus)    Severe headache; face, neck, or ear pain    Difficulty swallowing due to throat pain    Fever of 100.4 F (38 C)  Call 911, or get immediate medical care  Call emergency services right away if any of these occur:    Chest pain, shortness of breath, wheezing, or difficulty breathing    Coughing up blood    Inability to swallow due to throat pain    6016-1317 [employer]. [address] [place] [address]. All rights reserved. This information is not intended as a substitute for professional medical care. Always follow your healthcare professional's instructions.

## 2017-06-07 NOTE — PROGRESS NOTES
HPI    SUBJECTIVE:                                                    Citlali Adrian is a 42 year old female who presents to clinic today for the following health issues:      RESPIRATORY SYMPTOMS      Duration: started 6 days ago     Description  nasal congestion, rhinorrhea, sore throat, cough, ear pain left and fatigue/malaise    Severity: moderate    Accompanying signs and symptoms: None    History (predisposing factors):  none    Precipitating or alleviating factors: None    Therapies tried and outcome:  advil     No fevers.  Mostly dry cough, occassionally productive. Sore throat is keeping her awake at night.  Feels hard to swallow.  Thick post nasal drainage.    Can't use flonase; makes her sneeze and doesn't like it.        Problem list and histories reviewed & adjusted, as indicated.  Additional history: as documented    Current Outpatient Prescriptions   Medication Sig Dispense Refill     clobetasol propionate 0.05 % LIQD   2     clobetasol (TEMOVATE) 0.05 % ointment Apply sparingly to affected area twice daily as needed.  Do not apply to face. 45 g 1     Allergies   Allergen Reactions     No Known Allergies        Reviewed and updated as needed this visit by clinical staff  Tobacco  Allergies  Meds  Problems  Med Hx  Surg Hx  Fam Hx  Soc Hx        Reviewed and updated as needed this visit by Provider  Tobacco  Allergies  Meds  Problems  Med Hx  Surg Hx  Fam Hx  Soc Hx          ROS:  Constitutional, HEENT, cardiovascular, pulmonary, gi and gu systems are negative, except as otherwise noted.    OBJECTIVE:                                                    /76 (BP Location: Right arm, Cuff Size: Adult Regular)  Pulse 92  Temp 98.4  F (36.9  C) (Oral)  Resp 18  Wt 147 lb (66.7 kg)  LMP 05/12/2017  SpO2 98%  BMI 25.23 kg/m2  Body mass index is 25.23 kg/(m^2).  GENERAL: healthy, alert and no distress  HENT: ear canals and TM's normal, nasal mucosa congested and mouth without ulcers or  lesions, oropharynx and tonsillar erythema, no tonsillar exudate or enlargement  NECK: no adenopathy, no asymmetry, masses, or scars and thyroid normal to palpation  RESP: lungs clear to auscultation - no rales, rhonchi or wheezes  CV: regular rate and rhythm, normal S1 S2, no S3 or S4, no murmur, click or rub  SKIN: no suspicious lesions or rashes    Diagnostic Test Results:  Results for orders placed or performed in visit on 06/07/17   Rapid strep screen   Result Value Ref Range    Specimen Description Throat     Rapid Strep A Screen       NEGATIVE: No Group A streptococcal antigen detected by immunoassay, await   culture report.      Micro Report Status FINAL 06/07/2017           ASSESSMENT/PLAN:                                                    1. Throat pain    - Rapid strep screen    2. Viral URI  Supportive cares; Start Sudafed, saline nasal spray or rinse, humidifier, steamy shower.  If no improvement in 5 days may need antibiotics.        RTC as needed if no improvement or worsening symptoms    ANN Chapman CNP  [facility]      Physical Exam

## 2017-06-08 ENCOUNTER — MYC MEDICAL ADVICE (OUTPATIENT)
Dept: FAMILY MEDICINE | Facility: CLINIC | Age: 42
End: 2017-06-08

## 2017-06-08 DIAGNOSIS — R05.9 COUGH: Primary | ICD-10-CM

## 2017-06-08 LAB
BACTERIA SPEC CULT: NORMAL
MICRO REPORT STATUS: NORMAL
SPECIMEN SOURCE: NORMAL

## 2017-06-09 ENCOUNTER — MYC MEDICAL ADVICE (OUTPATIENT)
Dept: FAMILY MEDICINE | Facility: CLINIC | Age: 42
End: 2017-06-09

## 2017-06-09 DIAGNOSIS — R05.9 COUGH: Primary | ICD-10-CM

## 2017-06-09 RX ORDER — CODEINE PHOSPHATE AND GUAIFENESIN 10; 100 MG/5ML; MG/5ML
1 SOLUTION ORAL
Qty: 120 ML | Refills: 0 | Status: SHIPPED | OUTPATIENT
Start: 2017-06-09 | End: 2018-03-21

## 2017-06-09 NOTE — TELEPHONE ENCOUNTER
When she her symptoms were more sinus congestion/runny nose with minimal cough.  Now it sounds as though the cough is more bothersome.  She should really be seen in the clinic for follow up.    Janeth Rodriguez CNP

## 2017-06-09 NOTE — TELEPHONE ENCOUNTER
Robitussin AC ordered; Rx signed.  Don't take with any other sedating medications such as sleep aids or alcohol.  Can use Delsym during the day.      Janeth Rodriguez CNP

## 2017-06-12 ENCOUNTER — TELEPHONE (OUTPATIENT)
Dept: FAMILY MEDICINE | Facility: CLINIC | Age: 42
End: 2017-06-12

## 2017-06-12 DIAGNOSIS — J01.90 ACUTE SINUSITIS WITH SYMPTOMS > 10 DAYS: Primary | ICD-10-CM

## 2017-06-12 RX ORDER — CEFDINIR 300 MG/1
300 CAPSULE ORAL 2 TIMES DAILY
Qty: 20 CAPSULE | Refills: 0 | Status: SHIPPED | OUTPATIENT
Start: 2017-06-12 | End: 2017-06-22

## 2017-06-12 NOTE — TELEPHONE ENCOUNTER
Called Citlali to follow up office visit and mychart messages.  She continues to have nasal congestion, post nasal drainage, and is blowing a fair amount from nose.  She has some pressure behind her eyes, but notes that this is getting some better.  She continues to have a cough, but notes it may be improving some.  No fevers.  She is concerned this is turning into a sinus infection, but would like to give her symptoms one more day to see if they improve.  Will send a Rx for omnicef in for her should she need to fill it tomorrow.  Pt agrees with plan.    Janeth Rodriguez CNP

## 2017-06-13 NOTE — TELEPHONE ENCOUNTER
Late Entry    6/12/17  Rx was faxed to [facility], pharmacy will notify patient when ready to be picked up.   Filed at  desk.     Quinn Rincon   06/13/17

## 2017-07-24 ENCOUNTER — HOSPITAL ENCOUNTER (OUTPATIENT)
Dept: MAMMOGRAPHY | Facility: CLINIC | Age: 42
Discharge: HOME | End: 2017-07-24
Attending: NURSE PRACTITIONER | Admitting: NURSE PRACTITIONER
Payer: COMMERCIAL

## 2017-07-24 DIAGNOSIS — Z12.31 VISIT FOR SCREENING MAMMOGRAM: ICD-10-CM

## 2017-07-24 PROCEDURE — G0202 SCR MAMMO BI INCL CAD: HCPCS

## 2017-07-24 PROCEDURE — 77063 BREAST TOMOSYNTHESIS BI: CPT

## 2017-12-11 DIAGNOSIS — N13.30 HYDRONEPHROSIS, UNSPECIFIED HYDRONEPHROSIS TYPE: Primary | ICD-10-CM

## 2018-01-26 ENCOUNTER — HOSPITAL ENCOUNTER (OUTPATIENT)
Dept: ULTRASOUND IMAGING | Facility: CLINIC | Age: 43
Discharge: HOME | End: 2018-01-26
Attending: UROLOGY | Admitting: UROLOGY
Payer: COMMERCIAL

## 2018-01-26 DIAGNOSIS — N13.30 HYDRONEPHROSIS, UNSPECIFIED HYDRONEPHROSIS TYPE: ICD-10-CM

## 2018-01-26 PROCEDURE — 76770 US EXAM ABDO BACK WALL COMP: CPT

## 2018-03-21 ENCOUNTER — OFFICE VISIT (OUTPATIENT)
Dept: FAMILY MEDICINE | Facility: CLINIC | Age: 43
End: 2018-03-21
Payer: COMMERCIAL

## 2018-03-21 VITALS
RESPIRATION RATE: 12 BRPM | HEIGHT: 64 IN | DIASTOLIC BLOOD PRESSURE: 76 MMHG | BODY MASS INDEX: 26.12 KG/M2 | SYSTOLIC BLOOD PRESSURE: 100 MMHG | OXYGEN SATURATION: 100 % | WEIGHT: 153 LBS | TEMPERATURE: 98.1 F | HEART RATE: 70 BPM

## 2018-03-21 DIAGNOSIS — H69.92 DYSFUNCTION OF LEFT EUSTACHIAN TUBE: Primary | ICD-10-CM

## 2018-03-21 DIAGNOSIS — R51.9 NONINTRACTABLE EPISODIC HEADACHE, UNSPECIFIED HEADACHE TYPE: ICD-10-CM

## 2018-03-21 PROCEDURE — 99214 OFFICE O/P EST MOD 30 MIN: CPT | Performed by: NURSE PRACTITIONER

## 2018-03-21 NOTE — MR AVS SNAPSHOT
"              After Visit Summary   3/21/2018    Citlali Adrian    MRN: [id number]           Patient Information     Date Of Birth          1975        Visit Information        Provider Department      3/21/2018 8:30 AM Haase, Susan Rachele, APRN CNP [facility]        Today's Diagnoses     Dysfunction of left eustachian tube    -  1       Follow-ups after your visit        Follow-up notes from your care team     Return if symptoms worsen or fail to improve.      Who to contact     If you have questions or need follow up information about today's clinic visit or your schedule please contact [facility] directly at [phone].  Normal or non-critical lab and imaging results will be communicated to you by MyChart, letter or phone within 4 business days after the clinic has received the results. If you do not hear from us within 7 days, please contact the clinic through Jooobz!hart or phone. If you have a critical or abnormal lab result, we will notify you by phone as soon as possible.  Submit refill requests through Datanyze or call your pharmacy and they will forward the refill request to us. Please allow 3 business days for your refill to be completed.          Additional Information About Your Visit        MyChart Information     Datanyze gives you secure access to your electronic health record. If you see a primary care provider, you can also send messages to your care team and make appointments. If you have questions, please call your primary care clinic.  If you do not have a primary care provider, please call [phone] and they will assist you.        Care EveryWhere ID     This is your Care EveryWhere ID. This could be used by other organizations to access your [facility] medical records  [id number]        Your Vitals Were     Pulse Temperature Respirations Height Pulse Oximetry BMI (Body Mass Index)    70 98.1  F (36.7  C) (Oral) 12 5' 4\" (1.626 m) 100% 26.26 kg/m2       " Blood Pressure from Last 3 Encounters:   03/21/18 100/76   06/07/17 120/76   05/24/17 104/70    Weight from Last 3 Encounters:   03/21/18 153 lb (69.4 kg)   06/07/17 147 lb (66.7 kg)   05/24/17 151 lb (68.5 kg)              Today, you had the following     No orders found for display       Primary Care Provider Office Phone # Fax #    Susan Rachele Haase, APRN [phone] [address] [address] [facility] [address]        Equal Access to Services     [facility]: Hadii aad ku hadasho Soomaali, waaxda luqadaha, qaybta kaalmada adenorbertyada, rebecca duran . So [facility] [phone].    ATENCIÓN: Si habla español, tiene a laura disposición servicios gratuitos de asistencia lingüística. Llame al [phone].    We comply with applicable federal civil rights laws and [place] laws. We do not discriminate on the basis of race, color, national origin, age, disability, sex, sexual orientation, or gender identity.            Thank you!     Thank you for choosing [facility]  for your care. Our goal is always to provide you with excellent care. Hearing back from our patients is one way we can continue to improve our services. Please take a few minutes to complete the written survey that you may receive in the mail after your visit with us. Thank you!             Your Updated Medication List - Protect others around you: Learn how to safely use, store and throw away your medicines at www.disposemymeds.org.          This list is accurate as of 3/21/18  9:08 AM.  Always use your most recent med list.                   Brand Name Dispense Instructions for use Diagnosis    * clobetasol 0.05 % ointment    TEMOVATE    45 g    Apply sparingly to affected area twice daily as needed.  Do not apply to face.        * clobetasol propionate 0.05 % Liqd           * Notice:  This list has 2 medication(s) that are the same as other medications prescribed for you. Read the directions  carefully, and ask your doctor or other care provider to review them with you.

## 2018-04-03 ENCOUNTER — TELEPHONE (OUTPATIENT)
Dept: FAMILY MEDICINE | Facility: CLINIC | Age: 43
End: 2018-04-03

## 2018-04-03 ENCOUNTER — OFFICE VISIT (OUTPATIENT)
Dept: FAMILY MEDICINE | Facility: CLINIC | Age: 43
End: 2018-04-03
Payer: COMMERCIAL

## 2018-04-03 VITALS
WEIGHT: 153 LBS | TEMPERATURE: 98.1 F | BODY MASS INDEX: 26.26 KG/M2 | HEART RATE: 90 BPM | SYSTOLIC BLOOD PRESSURE: 102 MMHG | OXYGEN SATURATION: 100 % | RESPIRATION RATE: 14 BRPM | DIASTOLIC BLOOD PRESSURE: 70 MMHG

## 2018-04-03 DIAGNOSIS — J20.9 ACUTE BRONCHITIS, UNSPECIFIED ORGANISM: Primary | ICD-10-CM

## 2018-04-03 PROCEDURE — 99213 OFFICE O/P EST LOW 20 MIN: CPT | Performed by: NURSE PRACTITIONER

## 2018-04-03 RX ORDER — CODEINE PHOSPHATE AND GUAIFENESIN 10; 100 MG/5ML; MG/5ML
2 SOLUTION ORAL EVERY 4 HOURS PRN
Qty: 120 ML | Refills: 0 | Status: SHIPPED | OUTPATIENT
Start: 2018-04-03 | End: 2018-06-05

## 2018-04-03 RX ORDER — AZITHROMYCIN 250 MG/1
TABLET, FILM COATED ORAL
Qty: 6 TABLET | Refills: 0 | Status: SHIPPED | OUTPATIENT
Start: 2018-04-03 | End: 2018-06-05

## 2018-04-03 NOTE — PROGRESS NOTES
"  SUBJECTIVE:   Citlali Adrian is a 42 year old female who presents to clinic today for the following health issues:    RESPIRATORY SYMPTOMS      Duration: 9 days    Description  cough    Severity: moderate    Accompanying signs and symptoms: None    History (predisposing factors):  none    Precipitating or alleviating factors: None    Therapies tried and outcome:  none    Citlali reports that she has been sick since 3/25/2018. It started out as a cold and fatigue which developed into a sore throat and cough. She missed work last week due to her fatigue, and recently she has only been sleeping 2-4 hours/night de to her cough. A week ago she was coughing up \"thick green mucous\", but recently it has been an unproductive cough. However, the cough has been so forceful that she has been gagging. She has not been around anyone who is sick. She has been drinking plenty of fluids, and has not taken any OTC medication. She has not had past issues with bronchitis. She reports that the cough has continued to get worse. Denies fever, shortness of breath, chest pain. Nonsmoker.      Problem list and histories reviewed & adjusted, as indicated.  Additional history: as documented    Reviewed and updated as needed this visit by clinical staff  Tobacco  Allergies  Meds  Med Hx  Surg Hx  Fam Hx  Soc Hx      Reviewed and updated as needed this visit by Provider       ROS:  Constitutional, HEENT, cardiovascular, pulmonary and psych systems are negative, except as otherwise noted.    This document serves as a record of the services and decisions personally performed and made by Susan Haase, CNP. It was created on her behalf by Oskar De Jesus, a trained medical scribe. The creation of this document is based the provider's statements to the medical scribe.  Oskar De Jesus April 3, 2018 12:45 PM      OBJECTIVE:     /70 (BP Location: Left arm, Patient Position: Chair, Cuff Size: Adult Regular)  Pulse 90  Temp 98.1  F (36.7  C) (Oral)  " Resp 14  Wt 69.4 kg (153 lb)  SpO2 100%  BMI 26.26 kg/m2  Body mass index is 26.26 kg/(m^2).  GENERAL: healthy, alert and no distress  HENT: mild erythema of posterior oropharynx, ear canals and TM's normal, nose and mouth without ulcers or lesions  NECK: no adenopathy, no asymmetry, masses, or scars and thyroid normal to palpation  RESP: lungs clear to auscultation - no rales, rhonchi or wheezes  CV: regular rate and rhythm, normal S1 S2, no S3 or S4, no murmur, click or rub, no peripheral edema  PSYCH: mentation appears normal, affect normal/bright      ASSESSMENT/PLAN:     Citlali was seen today for uri.    Diagnoses and all orders for this visit:    Acute bronchitis, unspecified organism: discussed increased fluid intake and rest.    -     azithromycin (ZITHROMAX) 250 MG tablet; Two tablets first day, then one tablet daily for four days.  -     guaiFENesin-codeine (ROBITUSSIN AC) 100-10 MG/5ML SOLN solution; Take 10 mLs by mouth every 4 hours as needed        Patient Instructions   Take 2 tablets of Azithromycin today 1/day for the next four days.    Follow up as needed if symptoms get worse or do not improve.    The information in this document, created by the medical scribe for me, accurately reflects the services I personally performed and the decisions made by me. I have reviewed and approved this document for accuracy.   Susan Haase, CNP Susan Haase, APRN CNP  [facility]

## 2018-04-03 NOTE — TELEPHONE ENCOUNTER
Patient calls and states came down with something a week ago Sunday.  In bed 4 days-ST, cough, body aches, no fever.  Symptoms resolved other than cough.  States she knows cough lingers but states cough seems to be getting worse.  Can't sleep, urinary incontinence from the cough at times, gagging from coughing.  Scheduled for 12:45 pm with Misty to evaluate her cough.  Patient agrees with plan.  Ly Todd RN

## 2018-04-03 NOTE — MR AVS SNAPSHOT
After Visit Summary   4/3/2018    Citlali Adrian    MRN: [id number]           Patient Information     Date Of Birth          1975        Visit Information        Provider Department      4/3/2018 12:45 PM Haase, Susan Rachele, APRN CNP [facility]        Today's Diagnoses     Acute bronchitis, unspecified organism    -  1      Care Instructions    Take 2 tablets of Azithromycin today 1/day for the next four days.          Follow-ups after your visit        Who to contact     If you have questions or need follow up information about today's clinic visit or your schedule please contact [facility] directly at [phone].  Normal or non-critical lab and imaging results will be communicated to you by Agennixhart, letter or phone within 4 business days after the clinic has received the results. If you do not hear from us within 7 days, please contact the clinic through Agennixhart or phone. If you have a critical or abnormal lab result, we will notify you by phone as soon as possible.  Submit refill requests through BeQuan or call your pharmacy and they will forward the refill request to us. Please allow 3 business days for your refill to be completed.          Additional Information About Your Visit        MyChart Information     BeQuan gives you secure access to your electronic health record. If you see a primary care provider, you can also send messages to your care team and make appointments. If you have questions, please call your primary care clinic.  If you do not have a primary care provider, please call [phone] and they will assist you.        Care EveryWhere ID     This is your Care EveryWhere ID. This could be used by other organizations to access your [facility] medical records  [id number]        Your Vitals Were     Pulse Temperature Respirations Pulse Oximetry BMI (Body Mass Index)       90 98.1  F (36.7  C) (Oral) 14 100% 26.26 kg/m2        Blood  Pressure from Last 3 Encounters:   04/03/18 102/70   03/21/18 100/76   06/07/17 120/76    Weight from Last 3 Encounters:   04/03/18 153 lb (69.4 kg)   03/21/18 153 lb (69.4 kg)   06/07/17 147 lb (66.7 kg)              Today, you had the following     No orders found for display         Today's Medication Changes          These changes are accurate as of 4/3/18 12:58 PM.  If you have any questions, ask your nurse or doctor.               Start taking these medicines.        Dose/Directions    azithromycin 250 MG tablet   Commonly known as:  ZITHROMAX   Used for:  Acute bronchitis, unspecified organism   Started by:  Haase, Susan Rachele, APRN CNP        Two tablets first day, then one tablet daily for four days.   Quantity:  6 tablet   Refills:  0       guaiFENesin-codeine 100-10 MG/5ML Soln solution   Commonly known as:  ROBITUSSIN AC   Used for:  Acute bronchitis, unspecified organism   Started by:  Haase, Susan Rachele, APRN CNP        Dose:  2 tsp.   Take 10 mLs by mouth every 4 hours as needed   Quantity:  120 mL   Refills:  0            Where to get your medicines      These medications were sent to [facility] [place] [id number]     Phone:  [phone]     azithromycin 250 MG tablet         Some of these will need a paper prescription and others can be bought over the counter.  Ask your nurse if you have questions.     Bring a paper prescription for each of these medications     guaiFENesin-codeine 100-10 MG/5ML Soln solution                Primary Care Provider Office Phone # Fax #    Susan Rachele Haase, APRN [phone] [address] [facility] [address]        Equal Access to Services     EZRA PUGH AH: Melisa Armendariz, washaronda luqadaha, qaybta kaalmada adenorbertyada, rebecca carter. So [facility] [phone].    ATENCIÓN: Si habla español, tiene a laura disposición servicios gratuitos de  kayy lingüísticarmando. Annika al [phone].    We comply with applicable federal civil rights laws and [place] laws. We do not discriminate on the basis of race, color, national origin, age, disability, sex, sexual orientation, or gender identity.            Thank you!     Thank you for choosing [facility]  for your care. Our goal is always to provide you with excellent care. Hearing back from our patients is one way we can continue to improve our services. Please take a few minutes to complete the written survey that you may receive in the mail after your visit with us. Thank you!             Your Updated Medication List - Protect others around you: Learn how to safely use, store and throw away your medicines at www.disposemymeds.org.          This list is accurate as of 4/3/18 12:58 PM.  Always use your most recent med list.                   Brand Name Dispense Instructions for use Diagnosis    azithromycin 250 MG tablet    ZITHROMAX    6 tablet    Two tablets first day, then one tablet daily for four days.    Acute bronchitis, unspecified organism       * clobetasol 0.05 % ointment    TEMOVATE    45 g    Apply sparingly to affected area twice daily as needed.  Do not apply to face.        * clobetasol propionate 0.05 % Liqd           guaiFENesin-codeine 100-10 MG/5ML Soln solution    ROBITUSSIN AC    120 mL    Take 10 mLs by mouth every 4 hours as needed    Acute bronchitis, unspecified organism       * Notice:  This list has 2 medication(s) that are the same as other medications prescribed for you. Read the directions carefully, and ask your doctor or other care provider to review them with you.

## 2018-06-05 ENCOUNTER — OFFICE VISIT (OUTPATIENT)
Dept: FAMILY MEDICINE | Facility: CLINIC | Age: 43
End: 2018-06-05
Payer: COMMERCIAL

## 2018-06-05 VITALS
SYSTOLIC BLOOD PRESSURE: 102 MMHG | BODY MASS INDEX: 24.75 KG/M2 | WEIGHT: 145 LBS | HEART RATE: 68 BPM | TEMPERATURE: 97.6 F | RESPIRATION RATE: 16 BRPM | HEIGHT: 64 IN | DIASTOLIC BLOOD PRESSURE: 62 MMHG

## 2018-06-05 DIAGNOSIS — J02.9 VIRAL PHARYNGITIS: Primary | ICD-10-CM

## 2018-06-05 DIAGNOSIS — J06.9 VIRAL URI: ICD-10-CM

## 2018-06-05 LAB
DEPRECATED S PYO AG THROAT QL EIA: NORMAL
SPECIMEN SOURCE: NORMAL

## 2018-06-05 PROCEDURE — 99213 OFFICE O/P EST LOW 20 MIN: CPT | Performed by: FAMILY MEDICINE

## 2018-06-05 PROCEDURE — 87880 STREP A ASSAY W/OPTIC: CPT | Performed by: FAMILY MEDICINE

## 2018-06-05 PROCEDURE — 87081 CULTURE SCREEN ONLY: CPT | Performed by: FAMILY MEDICINE

## 2018-06-05 RX ORDER — AZITHROMYCIN 250 MG/1
TABLET, FILM COATED ORAL
Qty: 6 TABLET | Refills: 0 | Status: SHIPPED | OUTPATIENT
Start: 2018-06-05 | End: 2019-07-19

## 2018-06-05 RX ORDER — CLOBETASOL PROPIONATE 0.05 G/ML
SPRAY TOPICAL
Refills: 7 | COMMUNITY
Start: 2018-05-17 | End: 2018-06-05

## 2018-06-05 NOTE — PATIENT INSTRUCTIONS
Increase fluids  Gargle with warm water and salt   Follow up as needed   Viral Upper Respiratory Illness (Adult)  You have a viral upper respiratory illness (URI), which is another term for the common cold. This illness is contagious during the first few days. It is spread through the air by coughing and sneezing. It may also be spread by direct contact (touching the sick person and then touching your own eyes, nose, or mouth). Frequent handwashing will decrease risk of spread. Most viral illnesses go away within 7 to 10 days with rest and simple home remedies. Sometimes the illness may last for several weeks. Antibiotics will not kill a virus, and they are generally not prescribed for this condition.    Home care    If symptoms are severe, rest at home for the first 2 to 3 days. When you resume activity, don't let yourself get too tired.    Avoid being exposed to cigarette smoke (yours or others ).    You may use acetaminophen or ibuprofen to control pain and fever, unless another medicine was prescribed. If you have chronic liver or kidney disease, have ever had a stomach ulcer or gastrointestinal bleeding, or are taking blood-thinning medicines, talk with your healthcare provider before using these medicines. Aspirin should never be given to anyone under 18 years of age who is ill with a viral infection or fever. It may cause severe liver or brain damage.    Your appetite may be poor, so a light diet is fine. Avoid dehydration by drinking 6 to 8 glasses of fluids per day (water, soft drinks, juices, tea, or soup). Extra fluids will help loosen secretions in the nose and lungs.    Over-the-counter cold medicines will not shorten the length of time you re sick, but they may be helpful for the following symptoms: cough, sore throat, and nasal and sinus congestion. (Note: Do not use decongestants if you have high blood pressure.)  Follow-up care  Follow up with your healthcare provider, or as advised.  When to seek  medical advice  Call your healthcare provider right away if any of these occur:    Cough with lots of colored sputum (mucus)    Severe headache; face, neck, or ear pain    Difficulty swallowing due to throat pain    Fever of 100.4 F (38 C) or higher, or as directed by your healthcare provider  Call 911  Call 911 if any of these occur:    Chest pain, shortness of breath, wheezing, or difficulty breathing    Coughing up blood    Inability to swallow due to throat pain  Date Last Reviewed: 9/13/2015 2000-2017 [employer]. [address] [place], [place] [address]. All rights reserved. This information is not intended as a substitute for professional medical care. Always follow your healthcare professional's instructions.

## 2018-06-05 NOTE — MR AVS SNAPSHOT
After Visit Summary   6/5/2018    Citlali Adrian    MRN: [id number]           Patient Information     Date Of Birth          1975        Visit Information        Provider Department      6/5/2018 11:45 AM Yany So MD [facility]        Today's Diagnoses     Throat pain    -  1    Viral URI          Care Instructions      Increase fluids  Gargle with warm water and salt   Follow up as needed   Viral Upper Respiratory Illness (Adult)  You have a viral upper respiratory illness (URI), which is another term for the common cold. This illness is contagious during the first few days. It is spread through the air by coughing and sneezing. It may also be spread by direct contact (touching the sick person and then touching your own eyes, nose, or mouth). Frequent handwashing will decrease risk of spread. Most viral illnesses go away within 7 to 10 days with rest and simple home remedies. Sometimes the illness may last for several weeks. Antibiotics will not kill a virus, and they are generally not prescribed for this condition.    Home care    If symptoms are severe, rest at home for the first 2 to 3 days. When you resume activity, don't let yourself get too tired.    Avoid being exposed to cigarette smoke (yours or others ).    You may use acetaminophen or ibuprofen to control pain and fever, unless another medicine was prescribed. If you have chronic liver or kidney disease, have ever had a stomach ulcer or gastrointestinal bleeding, or are taking blood-thinning medicines, talk with your healthcare provider before using these medicines. Aspirin should never be given to anyone under 18 years of age who is ill with a viral infection or fever. It may cause severe liver or brain damage.    Your appetite may be poor, so a light diet is fine. Avoid dehydration by drinking 6 to 8 glasses of fluids per day (water, soft drinks, juices, tea, or soup). Extra fluids will help  loosen secretions in the nose and lungs.    Over-the-counter cold medicines will not shorten the length of time you re sick, but they may be helpful for the following symptoms: cough, sore throat, and nasal and sinus congestion. (Note: Do not use decongestants if you have high blood pressure.)  Follow-up care  Follow up with your healthcare provider, or as advised.  When to seek medical advice  Call your healthcare provider right away if any of these occur:    Cough with lots of colored sputum (mucus)    Severe headache; face, neck, or ear pain    Difficulty swallowing due to throat pain    Fever of 100.4 F (38 C) or higher, or as directed by your healthcare provider  Call 911  Call 911 if any of these occur:    Chest pain, shortness of breath, wheezing, or difficulty breathing    Coughing up blood    Inability to swallow due to throat pain  Date Last Reviewed: 9/13/2015 2000-2017 [employer]. [address] [place] [address]. All rights reserved. This information is not intended as a substitute for professional medical care. Always follow your healthcare professional's instructions.                Follow-ups after your visit        Follow-up notes from your care team     Return in about 1 week (around 6/12/2018).      Your next 10 appointments already scheduled     Jun 06, 2018  3:30 PM CDT   SHORT with Susan Rachele Haase, APRN CNP   [facility] ([facility]    [address] [place] [id number] [phone]              Who to contact     If you have questions or need follow up information about today's clinic visit or your schedule please contact [facility] directly at [phone].  Normal or non-critical lab and imaging results will be communicated to you by MyChart, letter or phone within 4 business days after the clinic has received the results. If you do not hear from us within 7 days, please contact the  "clinic through [employer] or phone. If you have a critical or abnormal lab result, we will notify you by phone as soon as possible.  Submit refill requests through [employer] or call your pharmacy and they will forward the refill request to us. Please allow 3 business days for your refill to be completed.          Additional Information About Your Visit        [employer] Information     [employer] gives you secure access to your electronic health record. If you see a primary care provider, you can also send messages to your care team and make appointments. If you have questions, please call your primary care clinic.  If you do not have a primary care provider, please call [phone] and they will assist you.        Care EveryWhere ID     This is your Care EveryWhere ID. This could be used by other organizations to access your [facility] medical records  [id number]        Your Vitals Were     Pulse Temperature Respirations Height BMI (Body Mass Index)       68 97.6  F (36.4  C) (Oral) 16 5' 4\" (1.626 m) 24.89 kg/m2        Blood Pressure from Last 3 Encounters:   06/05/18 102/62   04/03/18 102/70   03/21/18 100/76    Weight from Last 3 Encounters:   06/05/18 145 lb (65.8 kg)   04/03/18 153 lb (69.4 kg)   03/21/18 153 lb (69.4 kg)              We Performed the Following     Beta strep group A culture     Strep, Rapid Screen          Today's Medication Changes          These changes are accurate as of 6/5/18 12:28 PM.  If you have any questions, ask your nurse or doctor.               These medicines have changed or have updated prescriptions.        Dose/Directions    clobetasol 0.05 % ointment   Commonly known as:  TEMOVATE   This may have changed:  Another medication with the same name was removed. Continue taking this medication, and follow the directions you see here.        Apply sparingly to affected area twice daily as needed.  Do not apply to face.   Quantity:  45 g   Refills:  1         Stop taking these medicines if " you haven't already. Please contact your care team if you have questions.     guaiFENesin-codeine 100-10 MG/5ML Soln solution   Commonly known as:  ROBITUSSIN AC                Where to get your medicines      Some of these will need a paper prescription and others can be bought over the counter.  Ask your nurse if you have questions.     Bring a paper prescription for each of these medications     azithromycin 250 MG tablet                Primary Care Provider Office Phone # Fax #    Susan Rachele Haase, APRN [phone] [address] [address] [facility] [address]        Equal Access to Services     [facility]: Hadii aad ku hadasho Soomaali, waaxda luqadaha, qaybta kaalmada adeegyada, rebecca duran . So [facility] [phone].    ATENCIÓN: Si habla español, tiene a laura disposición servicios gratuitos de asistencia lingüística. [facility] [phone].    We comply with applicable federal civil rights laws and [place] laws. We do not discriminate on the basis of race, color, national origin, age, disability, sex, sexual orientation, or gender identity.            Thank you!     Thank you for choosing [facility]  for your care. Our goal is always to provide you with excellent care. Hearing back from our patients is one way we can continue to improve our services. Please take a few minutes to complete the written survey that you may receive in the mail after your visit with us. Thank you!             Your Updated Medication List - Protect others around you: Learn how to safely use, store and throw away your medicines at www.disposemymeds.org.          This list is accurate as of 6/5/18 12:28 PM.  Always use your most recent med list.                   Brand Name Dispense Instructions for use Diagnosis    azithromycin 250 MG tablet    ZITHROMAX    6 tablet    Two tablets first day, then one tablet daily for four days.    Viral URI       clobetasol 0.05 %  ointment    TEMOVATE    45 g    Apply sparingly to affected area twice daily as needed.  Do not apply to face.

## 2018-06-05 NOTE — PROGRESS NOTES
SUBJECTIVE:   Citlali Adrian is a 43 year old female who presents to clinic today for the following health issues:      Acute Illness   Acute illness concerns: sinus pain/pressure; sore throat   Onset: x6 days    Fever: no    Chills/Sweats: YES    Headache (location?): YES- mild     Sinus Pressure:YES    Conjunctivitis:  no    Ear Pain: YES: left    Rhinorrhea: YES    Congestion: YES    Sore Throat: YES- states this is the worse she has ever had.     PND: no     Teeth pain: no      Cough: no    Wheeze: no    Shortness of breath: no     Decreased Appetite: no    Nausea: no    Vomiting: no    Diarrhea:  no    Dysuria/Freq.: no    Fatigue/Achiness: YES    Sick/Strep Exposure: sister      Therapies Tried and outcome: advil           Problem list and histories reviewed & adjusted, as indicated.  Additional history: as documented    Patient Active Problem List   Diagnosis     Other psoriasis     Abdominal pain, right upper quadrant     Performance anxiety     CARDIOVASCULAR SCREENING; LDL GOAL LESS THAN 160     Shoulder impingement syndrome     Fatty liver     Family hx-stroke (mom)     Hydronephrosis, unspecified hydronephrosis type     Past Surgical History:   Procedure Laterality Date     D & C         Social History   Substance Use Topics     Smoking status: Never Smoker     Smokeless tobacco: Never Used     Alcohol use Yes      Comment: socially     Family History   Problem Relation Age of Onset     Hypertension Mother      Lipids Mother      CEREBROVASCULAR DISEASE Mother      Cancer - colorectal Father      age 78     Breast Cancer Maternal Grandmother      post-menopausal     Hypertension Brother      Lipids Brother      DIABETES Maternal Uncle      Genitourinary Problems Brother      kidney stones - age 50s     Hypertension Sister      50s           Reviewed and updated as needed this visit by clinical staff  Allergies  Meds       Reviewed and updated as needed this visit by Provider      "    ROS:  Constitutional, HEENT, cardiovascular, pulmonary, gi  systems are negative, except as otherwise noted.    OBJECTIVE:     /62 (BP Location: Right arm, Patient Position: Chair, Cuff Size: Adult Regular)  Pulse 68  Temp 97.6  F (36.4  C) (Oral)  Resp 16  Ht 5' 4\" (1.626 m)  Wt 145 lb (65.8 kg)  BMI 24.89 kg/m2  Body mass index is 24.89 kg/(m^2).  GENERAL: healthy, alert and no distress  HENT: normal cephalic/atraumatic, ear canals and TM's normal, nose -  irritation of left anterior plexus  and mouth without ulcers or lesions, oropharynx clear, oral mucous membranes moist and sinuses: not tender,   NECK: no adenopathy  RESP: lungs clear to auscultation - no rales, rhonchi or wheezes  CV: regular rate and rhythm, normal S1 S2    Diagnostic Test Results:  Results for orders placed or performed in visit on 06/05/18 (from the past 24 hour(s))   Strep, Rapid Screen   Result Value Ref Range    Specimen Description Throat     Rapid Strep A Screen       NEGATIVE: No Group A streptococcal antigen detected by immunoassay, await culture report.       ASSESSMENT/PLAN:         1. Viral pharyngitis  - Strep, Rapid Screen- negative   - Beta strep group A culture    2. Viral URI  - supportive care discussed.   - per patient her doctor always gives her antibiotics because these usually turn into sinus infections- no clear evidence of that today. Watch and wait Rx given.   - azithromycin (ZITHROMAX) 250 MG tablet; Two tablets first day, then one tablet daily for four days.  Dispense: 6 tablet; Refill: 0    See Patient Instructions    Yany So MD  [facility]    "

## 2018-06-06 LAB
BACTERIA SPEC CULT: NORMAL
SPECIMEN SOURCE: NORMAL

## 2019-07-19 ENCOUNTER — OFFICE VISIT (OUTPATIENT)
Dept: PEDIATRICS | Facility: CLINIC | Age: 44
End: 2019-07-19
Payer: COMMERCIAL

## 2019-07-19 VITALS
BODY MASS INDEX: 23.39 KG/M2 | DIASTOLIC BLOOD PRESSURE: 64 MMHG | HEIGHT: 64 IN | OXYGEN SATURATION: 99 % | WEIGHT: 137 LBS | RESPIRATION RATE: 16 BRPM | HEART RATE: 81 BPM | SYSTOLIC BLOOD PRESSURE: 90 MMHG | TEMPERATURE: 98.1 F

## 2019-07-19 DIAGNOSIS — R09.82 POST-NASAL DRIP: Primary | ICD-10-CM

## 2019-07-19 PROCEDURE — 99213 OFFICE O/P EST LOW 20 MIN: CPT | Performed by: PEDIATRICS

## 2019-07-19 RX ORDER — CLOBETASOL PROPIONATE 0.05 G/ML
SPRAY TOPICAL
Refills: 10 | COMMUNITY
Start: 2019-07-15

## 2019-07-19 ASSESSMENT — MIFFLIN-ST. JEOR: SCORE: 1251.43

## 2019-07-19 NOTE — PATIENT INSTRUCTIONS
Sudafed (behind the counter) - ask to see if they have long acting (daily).  Also do daily zyrtec.    Other nasal sprays: Nasonex (steroid nasal spray) 2 puffs each side daily.    If not improving after 2 weeks let us know.

## 2019-07-19 NOTE — PROGRESS NOTES
"Subjective     Citlali Adrian is a 44 year old female who presents to clinic today for the following health issues:    HPI   Throat problem      Duration: 3-4 weeks    Description (location/character/radiation): Thickness, fullness in back of throat causing drainage and sore throat.  [place] in [place] - felt like sore throat/cold for a few days, then better. Then having gradual increase in drainage.  Yellow drainage from nose - feeling like needs to swallow hard and then hacks up a yellow chunk.  Denies heartburn symptoms, no new allergies she can think of.    Gagging sensation of uvula, affecting voice - then resolved, but still feeling of fullness.       Intensity:  moderate    Accompanying signs and symptoms:     History (similar episodes/previous evaluation): None    Precipitating or alleviating factors: None    Therapies tried and outcome: None         Reviewed and updated as needed this visit by Provider         Review of Systems   ROS COMP: Constitutional, HEENT, cardiovascular, pulmonary, gi and gu systems are negative, except as otherwise noted.      Objective    BP 90/64 (BP Location: Right arm, Cuff Size: Adult Regular)   Pulse 81   Temp 98.1  F (36.7  C) (Oral)   Resp 16   Ht 1.626 m (5' 4\")   Wt 62.1 kg (137 lb)   SpO2 99%   BMI 23.52 kg/m    Body mass index is 23.52 kg/m .  Physical Exam   GENERAL APPEARANCE: healthy, alert and no distress  HENT: ear canals and TM's normal and clear drainage post op  NECK: no adenopathy, no asymmetry, masses, or scars and thyroid normal to palpation  RESP: lungs clear to auscultation - no rales, rhonchi or wheezes  CV: regular rates and rhythm, normal S1 S2, no S3 or S4 and no murmur, click or rub    Diagnostic Test Results:  none         Assessment & Plan     1. Post-nasal drip  History and exam c/w post nasal drip - see PI.         Patient Instructions   Sudafed (behind the counter) - ask to see if they have long acting (daily).  Also do daily zyrtec.    Other " nasal sprays: Nasonex (steroid nasal spray) 2 puffs each side daily.    If not improving after 2 weeks let us know.      No follow-ups on file.    Sera Mathur MD  [facility]

## 2019-08-30 ENCOUNTER — OFFICE VISIT (OUTPATIENT)
Dept: FAMILY MEDICINE | Facility: CLINIC | Age: 44
End: 2019-08-30
Payer: COMMERCIAL

## 2019-08-30 VITALS
WEIGHT: 136.8 LBS | SYSTOLIC BLOOD PRESSURE: 102 MMHG | HEART RATE: 67 BPM | HEIGHT: 65 IN | TEMPERATURE: 97.5 F | RESPIRATION RATE: 16 BRPM | DIASTOLIC BLOOD PRESSURE: 71 MMHG | BODY MASS INDEX: 22.79 KG/M2 | OXYGEN SATURATION: 99 %

## 2019-08-30 DIAGNOSIS — Z00.00 ROUTINE GENERAL MEDICAL EXAMINATION AT A HEALTH CARE FACILITY: Primary | ICD-10-CM

## 2019-08-30 DIAGNOSIS — R13.10 DYSPHAGIA, UNSPECIFIED TYPE: ICD-10-CM

## 2019-08-30 LAB
ALBUMIN SERPL-MCNC: 3.8 G/DL (ref 3.4–5)
ALP SERPL-CCNC: 55 U/L (ref 40–150)
ALT SERPL W P-5'-P-CCNC: 21 U/L (ref 0–50)
ANION GAP SERPL CALCULATED.3IONS-SCNC: 7 MMOL/L (ref 3–14)
AST SERPL W P-5'-P-CCNC: 12 U/L (ref 0–45)
BASOPHILS # BLD AUTO: 0.1 10E9/L (ref 0–0.2)
BASOPHILS NFR BLD AUTO: 0.8 %
BILIRUB SERPL-MCNC: 0.4 MG/DL (ref 0.2–1.3)
BUN SERPL-MCNC: 14 MG/DL (ref 7–30)
CALCIUM SERPL-MCNC: 8.9 MG/DL (ref 8.5–10.1)
CHLORIDE SERPL-SCNC: 108 MMOL/L (ref 94–109)
CHOLEST SERPL-MCNC: 132 MG/DL
CO2 SERPL-SCNC: 27 MMOL/L (ref 20–32)
CREAT SERPL-MCNC: 0.67 MG/DL (ref 0.52–1.04)
DIFFERENTIAL METHOD BLD: NORMAL
EOSINOPHIL # BLD AUTO: 0.1 10E9/L (ref 0–0.7)
EOSINOPHIL NFR BLD AUTO: 2 %
ERYTHROCYTE [DISTWIDTH] IN BLOOD BY AUTOMATED COUNT: 13.3 % (ref 10–15)
GFR SERPL CREATININE-BSD FRML MDRD: >90 ML/MIN/{1.73_M2}
GLUCOSE SERPL-MCNC: 91 MG/DL (ref 70–99)
HCT VFR BLD AUTO: 37.8 % (ref 35–47)
HDLC SERPL-MCNC: 59 MG/DL
HGB BLD-MCNC: 12.5 G/DL (ref 11.7–15.7)
LDLC SERPL CALC-MCNC: 61 MG/DL
LYMPHOCYTES # BLD AUTO: 1.4 10E9/L (ref 0.8–5.3)
LYMPHOCYTES NFR BLD AUTO: 23.6 %
MCH RBC QN AUTO: 30 PG (ref 26.5–33)
MCHC RBC AUTO-ENTMCNC: 33.1 G/DL (ref 31.5–36.5)
MCV RBC AUTO: 91 FL (ref 78–100)
MONOCYTES # BLD AUTO: 0.5 10E9/L (ref 0–1.3)
MONOCYTES NFR BLD AUTO: 8.9 %
NEUTROPHILS # BLD AUTO: 3.9 10E9/L (ref 1.6–8.3)
NEUTROPHILS NFR BLD AUTO: 64.7 %
NONHDLC SERPL-MCNC: 73 MG/DL
PLATELET # BLD AUTO: 290 10E9/L (ref 150–450)
POTASSIUM SERPL-SCNC: 4.3 MMOL/L (ref 3.4–5.3)
PROT SERPL-MCNC: 7 G/DL (ref 6.8–8.8)
RBC # BLD AUTO: 4.17 10E12/L (ref 3.8–5.2)
SODIUM SERPL-SCNC: 142 MMOL/L (ref 133–144)
TRIGL SERPL-MCNC: 58 MG/DL
TSH SERPL DL<=0.005 MIU/L-ACNC: 2.46 MU/L (ref 0.4–4)
WBC # BLD AUTO: 6.1 10E9/L (ref 4–11)

## 2019-08-30 PROCEDURE — 84443 ASSAY THYROID STIM HORMONE: CPT | Performed by: NURSE PRACTITIONER

## 2019-08-30 PROCEDURE — 80061 LIPID PANEL: CPT | Performed by: NURSE PRACTITIONER

## 2019-08-30 PROCEDURE — 80053 COMPREHEN METABOLIC PANEL: CPT | Performed by: NURSE PRACTITIONER

## 2019-08-30 PROCEDURE — 86800 THYROGLOBULIN ANTIBODY: CPT | Performed by: NURSE PRACTITIONER

## 2019-08-30 PROCEDURE — 36415 COLL VENOUS BLD VENIPUNCTURE: CPT | Performed by: NURSE PRACTITIONER

## 2019-08-30 PROCEDURE — 99396 PREV VISIT EST AGE 40-64: CPT | Performed by: NURSE PRACTITIONER

## 2019-08-30 PROCEDURE — 85025 COMPLETE CBC W/AUTO DIFF WBC: CPT | Performed by: NURSE PRACTITIONER

## 2019-08-30 SDOH — SOCIAL STABILITY: SOCIAL NETWORK: HOW OFTEN DO YOU GET TOGETHER WITH FRIENDS OR RELATIVES?: NEVER

## 2019-08-30 SDOH — SOCIAL STABILITY: SOCIAL NETWORK: HOW OFTEN DO YOU ATTENT MEETINGS OF THE CLUB OR ORGANIZATION YOU BELONG TO?: PATIENT DECLINED

## 2019-08-30 SDOH — ECONOMIC STABILITY: FOOD INSECURITY: WITHIN THE PAST 12 MONTHS, THE FOOD YOU BOUGHT JUST DIDN'T LAST AND YOU DIDN'T HAVE MONEY TO GET MORE.: NEVER TRUE

## 2019-08-30 SDOH — HEALTH STABILITY: MENTAL HEALTH: HOW MANY STANDARD DRINKS CONTAINING ALCOHOL DO YOU HAVE ON A TYPICAL DAY?: 3 OR 4

## 2019-08-30 SDOH — SOCIAL STABILITY: SOCIAL NETWORK: ARE YOU MARRIED, WIDOWED, DIVORCED, SEPARATED, NEVER MARRIED, OR LIVING WITH A PARTNER?: MARRIED

## 2019-08-30 SDOH — ECONOMIC STABILITY: TRANSPORTATION INSECURITY
IN THE PAST 12 MONTHS, HAS THE LACK OF TRANSPORTATION KEPT YOU FROM MEDICAL APPOINTMENTS OR FROM GETTING MEDICATIONS?: NO

## 2019-08-30 SDOH — ECONOMIC STABILITY: TRANSPORTATION INSECURITY
IN THE PAST 12 MONTHS, HAS LACK OF TRANSPORTATION KEPT YOU FROM MEETINGS, WORK, OR FROM GETTING THINGS NEEDED FOR DAILY LIVING?: NO

## 2019-08-30 SDOH — HEALTH STABILITY: PHYSICAL HEALTH: ON AVERAGE, HOW MANY DAYS PER WEEK DO YOU ENGAGE IN MODERATE TO STRENUOUS EXERCISE (LIKE A BRISK WALK)?: 3 DAYS

## 2019-08-30 SDOH — HEALTH STABILITY: PHYSICAL HEALTH: ON AVERAGE, HOW MANY MINUTES DO YOU ENGAGE IN EXERCISE AT THIS LEVEL?: 50 MIN

## 2019-08-30 SDOH — HEALTH STABILITY: MENTAL HEALTH: HOW OFTEN DO YOU HAVE A DRINK CONTAINING ALCOHOL?: MONTHLY OR LESS

## 2019-08-30 SDOH — SOCIAL STABILITY: SOCIAL NETWORK
IN A TYPICAL WEEK, HOW MANY TIMES DO YOU TALK ON THE PHONE WITH FAMILY, FRIENDS, OR NEIGHBORS?: MORE THAN THREE TIMES A WEEK

## 2019-08-30 SDOH — SOCIAL STABILITY: SOCIAL NETWORK: HOW OFTEN DO YOU ATTEND CHURCH OR RELIGIOUS SERVICES?: 1 TO 4 TIMES PER YEAR

## 2019-08-30 SDOH — HEALTH STABILITY: MENTAL HEALTH: HOW OFTEN DO YOU HAVE 6 OR MORE DRINKS ON ONE OCCASION?: NEVER

## 2019-08-30 SDOH — SOCIAL STABILITY: SOCIAL NETWORK
DO YOU BELONG TO ANY CLUBS OR ORGANIZATIONS SUCH AS CHURCH GROUPS UNIONS, FRATERNAL OR ATHLETIC GROUPS, OR SCHOOL GROUPS?: NO

## 2019-08-30 SDOH — ECONOMIC STABILITY: FOOD INSECURITY: WITHIN THE PAST 12 MONTHS, YOU WORRIED THAT YOUR FOOD WOULD RUN OUT BEFORE YOU GOT MONEY TO BUY MORE.: NEVER TRUE

## 2019-08-30 SDOH — ECONOMIC STABILITY: INCOME INSECURITY: HOW HARD IS IT FOR YOU TO PAY FOR THE VERY BASICS LIKE FOOD, HOUSING, MEDICAL CARE, AND HEATING?: NOT HARD AT ALL

## 2019-08-30 ASSESSMENT — ENCOUNTER SYMPTOMS
COUGH: 0
NERVOUS/ANXIOUS: 0
FEVER: 0
DIZZINESS: 0
CHILLS: 0
ABDOMINAL PAIN: 0
CONSTIPATION: 0
DIARRHEA: 0
EYE PAIN: 0
HEMATOCHEZIA: 0
HEMATURIA: 0

## 2019-08-30 ASSESSMENT — MIFFLIN-ST. JEOR: SCORE: 1258.46

## 2019-08-30 NOTE — PROGRESS NOTES
SUBJECTIVE:   CC: Citlali Adrian is an 44 year old woman who presents for preventive health visit.     Healthy Habits:     Getting at least 3 servings of Calcium per day:  Yes    Bi-annual eye exam:  Yes    Dental care twice a year:  Yes    Sleep apnea or symptoms of sleep apnea:  None    Diet:  Other    Frequency of exercise:  2-3 days/week    Duration of exercise:  30-45 minutes    Taking medications regularly:  Yes    Medication side effects:  None    PHQ-2 Total Score: 0    Additional concerns today:  No  1.  Breast cancer screening: MGM with history of breast cancer. Mammogram in 2017,normal.  2. Cervical cancer screening: last pap 2017, followed by GYN clinic.  3. Colon cancer screening:  Done in 2011.   4.  Throat concern:  For the last 2 months has had the sensation of congestion or fullness in the back of her throat, also new onset of snoring.  She is concerned about her thyroid.  Denies difficulty swallowing or choking.  5. Skin:  Followed by dermatology.  Today's PHQ-2 Score:   PHQ-2 ( 1999 [employer]) 8/30/2019   Q1: Little interest or pleasure in doing things 0   Q2: Feeling down, depressed or hopeless 0   PHQ-2 Score 0   Q1: Little interest or pleasure in doing things Not at all   Q2: Feeling down, depressed or hopeless Not at all   PHQ-2 Score 0       Abuse: Current or Past(Physical, Sexual or Emotional)- No  Do you feel safe in your environment? Yes    Social History     Tobacco Use     Smoking status: Never Smoker     Smokeless tobacco: Never Used   Substance Use Topics     Alcohol use: Yes     Frequency: Monthly or less     Drinks per session: 3 or 4     Binge frequency: Never     Comment: socially         Alcohol Use 8/30/2019   Prescreen: >3 drinks/day or >7 drinks/week? No   Prescreen: >3 drinks/day or >7 drinks/week? -       Reviewed orders with patient.  Reviewed health maintenance and updated orders accordingly - Yes  BP Readings from Last 3 Encounters:   08/30/19 102/71   07/19/19 90/64    06/05/18 102/62    Wt Readings from Last 3 Encounters:   08/30/19 62.1 kg (136 lb 12.8 oz)   07/19/19 62.1 kg (137 lb)   06/05/18 65.8 kg (145 lb)         Patient Active Problem List   Diagnosis     Other psoriasis     Abdominal pain, right upper quadrant     Performance anxiety     CARDIOVASCULAR SCREENING; LDL GOAL LESS THAN 160     Shoulder impingement syndrome     Fatty liver     Family hx-stroke (mom)     Hydronephrosis, unspecified hydronephrosis type     Post-nasal drip     Past Surgical History:   Procedure Laterality Date     D & C         Social History     Tobacco Use     Smoking status: Never Smoker     Smokeless tobacco: Never Used   Substance Use Topics     Alcohol use: Yes     Frequency: Monthly or less     Drinks per session: 3 or 4     Binge frequency: Never     Comment: socially     Family History   Problem Relation Age of Onset     Hypertension Mother      Lipids Mother      Cerebrovascular Disease Mother      Cancer - colorectal Father         age 78     Breast Cancer Maternal Grandmother         post-menopausal     Hypertension Brother      Lipids Brother      Diabetes Maternal Uncle      Genitourinary Problems Brother         kidney stones - age 50s     Hypertension Sister         50s         Current Outpatient Medications   Medication Sig Dispense Refill     clobetasol propionate 0.05 % LIQD APPLY TO SCALP EVERY DAY  10       Mammogram Screening: Patient under age 50, mutual decision reflected in health maintenance.      Pertinent mammograms are reviewed under the imaging tab.  History of abnormal Pap smear: NO - age 30- 65 PAP every 3 years recommended     Reviewed and updated as needed this visit by clinical staff  Tobacco  Allergies  Meds  Med Hx  Surg Hx  Fam Hx  Soc Hx        Reviewed and updated as needed this visit by Provider          Review of Systems   Constitutional: Negative for chills and fever.   HENT: Positive for congestion. Negative for ear pain.    Eyes: Negative for  "pain.   Respiratory: Negative for cough.    Cardiovascular: Negative for chest pain.   Gastrointestinal: Negative for abdominal pain, constipation, diarrhea and hematochezia.   Genitourinary: Negative for hematuria.   Neurological: Negative for dizziness.   Psychiatric/Behavioral: The patient is not nervous/anxious.      OBJECTIVE:   /71 (BP Location: Right arm, Patient Position: Sitting, Cuff Size: Adult Regular)   Pulse 67   Temp 97.5  F (36.4  C) (Oral)   Resp 16   Ht 1.638 m (5' 4.5\")   Wt 62.1 kg (136 lb 12.8 oz)   LMP 08/27/2019 (Exact Date)   SpO2 99%   Breastfeeding? No   BMI 23.12 kg/m    Physical Exam  GENERAL: healthy, alert and no distress  EYES: Eyes grossly normal to inspection, PERRL and conjunctivae and sclerae normal  HENT: ear canals and TM's normal, nose and mouth without ulcers or lesions  NECK: no adenopathy, no asymmetry, masses, or scars and thyroid normal to palpation  RESP: lungs clear to auscultation - no rales, rhonchi or wheezes  BREAST: defer per patient request done by GYN   CV: regular rate and rhythm, normal S1 S2, no S3 or S4, no murmur, click or rub, no peripheral edema  ABDOMEN: soft, nontender, no hepatosplenomegaly, no masses and bowel sounds normal  MS: no gross musculoskeletal defects noted, no edema  SKIN: no suspicious lesions or rashes  NEURO: Normal strength and tone, mentation intact and speech normal  PSYCH: mentation appears normal, affect normal/bright    ASSESSMENT/PLAN:   1. Routine general medical examination at a health care facility    - CBC with platelets differential  - Comprehensive metabolic panel  - Lipid panel reflex to direct LDL Fasting  - TSH with free T4 reflex  - Anti thyroglobulin antibody    2. Dysphagia, unspecified type, throat issue:  Discussed obtaining TSH level and thyroid US, also trial of zyrtec for 2 weeks to see if throat issue related to allergy type issues.  If symptoms continue would recommend referral to ENT   - US Thyroid; " "Future    COUNSELING:  Reviewed preventive health counseling, as reflected in patient instructions       Regular exercise       Healthy diet/nutrition    Estimated body mass index is 23.12 kg/m  as calculated from the following:    Height as of this encounter: 1.638 m (5' 4.5\").    Weight as of this encounter: 62.1 kg (136 lb 12.8 oz).         reports that she has never smoked. She has never used smokeless tobacco.      Counseling Resources:  ATP IV Guidelines  Pooled Cohorts Equation Calculator  Breast Cancer Risk Calculator  FRAX Risk Assessment  ICSI Preventive Guidelines  Dietary Guidelines for Americans, 2010  USDA's MyPlate  ASA Prophylaxis  Lung CA Screening  Follow up in 1 year, sooner as needed.   Susan Haase, APRN [facility]"

## 2019-08-31 ENCOUNTER — MYC MEDICAL ADVICE (OUTPATIENT)
Dept: FAMILY MEDICINE | Facility: CLINIC | Age: 44
End: 2019-08-31

## 2019-08-31 NOTE — RESULT ENCOUNTER NOTE
Des Godwin,   Your lab results are as below:  1)  TSH (thyroid level) 2.46  which is normal (range 0.4-4)  2)  Cholesterol is normal at 132,  your LDL (bad cholesterol) and your HDL (good cholesterol) are also within normal range.   3)  Glucose is normal at 91 (normal fasting is <100).  4)  CBC (checks for anemia and infection) is normal.     If you have any questions do not hesitate to call the clinic to discuss the results with me further.     Sincerely,    Susan Haase, CNP

## 2019-09-01 LAB — THYROGLOB AB SERPL IA-ACNC: <20 IU/ML (ref 0–40)

## 2019-09-03 NOTE — TELEPHONE ENCOUNTER
Citlali has viewed result note.  Ly Todd RN    Viewed by Citlali Adrain on 9/2/2019  8:20 PM   Written by Haase, Susan Rachele, APRN CNP on 9/2/2019  7:23 PM   Hi Citlali,   Your anti thyroglobulin antibody is normal at <20.   Sincerely,     Susan Haase, CNP

## 2019-09-10 ENCOUNTER — HOSPITAL ENCOUNTER (OUTPATIENT)
Dept: ULTRASOUND IMAGING | Facility: CLINIC | Age: 44
Discharge: HOME | End: 2019-09-10
Attending: NURSE PRACTITIONER | Admitting: NURSE PRACTITIONER
Payer: COMMERCIAL

## 2019-09-10 ENCOUNTER — TELEPHONE (OUTPATIENT)
Dept: FAMILY MEDICINE | Facility: CLINIC | Age: 44
End: 2019-09-10

## 2019-09-10 DIAGNOSIS — R13.10 DYSPHAGIA, UNSPECIFIED TYPE: ICD-10-CM

## 2019-09-10 PROCEDURE — 76536 US EXAM OF HEAD AND NECK: CPT

## 2019-09-11 NOTE — TELEPHONE ENCOUNTER
Ernestine Cardozo CNP -  Can you please review patient's ultrasound and see if it is okay to wait to see you until 10/25 ? That is the next opening Lu was able to find as an emergent appt.     Reviewed endo scheduling with Lu HUERTAS   Next available emergency spot that can be used on Ernestine's schedule is on 10/25/19 at 11:15     Will need to discuss with Ernestine Cardozo NP to look at her schedule and see if she has any additional recommendations     Cheyanne Chaney, Registered Nurse   [facility]

## 2019-09-11 NOTE — TELEPHONE ENCOUNTER
Cheyanne,  Can you check with Ernestine Cardozo and see how soon she can see Citlali for the abnormal US?  I haven't called Citlali with the result yet I wanted to have a plan set up before I called her.  Feel free to text me/call with any questions.  Thanks for checking,  Susan Haase, CNP

## 2019-09-11 NOTE — TELEPHONE ENCOUNTER
I reviewed her ultrasound.  I don't see anything urgent. The October appointment is fine.   She only has one small thyroid nodule, too small to biopsy but we'll discuss thyroid nodule management in detail at her appointment.  Thanks  Ernestine Cadrozo NP  Endocrinology

## 2019-09-12 DIAGNOSIS — E04.1 THYROID NODULE: Primary | ICD-10-CM

## 2019-09-12 NOTE — TELEPHONE ENCOUNTER
Spoke at length with Citlali about result, she would like to be seen sooner if possible, explained October is the soonest and her finding is not urgent.  She will take the October appointment.  All questions answered.  Susan Haase, CNP

## 2019-09-12 NOTE — TELEPHONE ENCOUNTER
Des Leigh,  Please set up Citlali for the October 25th appointment.  Thanks,  Susan Haase, DONALD Sinha,  Thank you so much for your input, I knew Citlali would have many questions when I called her.  Misty

## 2019-09-12 NOTE — RESULT ENCOUNTER NOTE
Des Godwin,  Here is the result of the ultrasound that we discussed.   A 0.4 cm nodule is in the left lobe of the thyroid gland.   Sincerely,     Susan Haase, CNP

## 2019-09-12 NOTE — TELEPHONE ENCOUNTER
Patient called today.  Was told that Lu will be calling back with a specific work in time, when she is not with a patient.  Please call and schedule patient into an appropriate work in time for Ernestine's schedule.

## 2019-09-12 NOTE — TELEPHONE ENCOUNTER
Spoke with patient.  Ernestine Cardozo has since added on a day to her schedule.  Was able to book the patient a future appointment with Ernestine Cardozo on 9/27/19 at 8:00 a.m.  Lu Tyson CMA on 9/12/2019 at 11:35 AM

## 2019-09-27 ENCOUNTER — OFFICE VISIT (OUTPATIENT)
Dept: ENDOCRINOLOGY | Facility: CLINIC | Age: 44
End: 2019-09-27
Payer: COMMERCIAL

## 2019-09-27 ENCOUNTER — MYC MEDICAL ADVICE (OUTPATIENT)
Dept: ENDOCRINOLOGY | Facility: CLINIC | Age: 44
End: 2019-09-27

## 2019-09-27 VITALS
BODY MASS INDEX: 23.66 KG/M2 | DIASTOLIC BLOOD PRESSURE: 77 MMHG | WEIGHT: 140 LBS | SYSTOLIC BLOOD PRESSURE: 121 MMHG | HEART RATE: 66 BPM | TEMPERATURE: 98.4 F | RESPIRATION RATE: 12 BRPM

## 2019-09-27 DIAGNOSIS — E04.1 THYROID NODULE: Primary | ICD-10-CM

## 2019-09-27 PROCEDURE — 86376 MICROSOMAL ANTIBODY EACH: CPT | Performed by: CLINICAL NURSE SPECIALIST

## 2019-09-27 PROCEDURE — 36415 COLL VENOUS BLD VENIPUNCTURE: CPT | Performed by: CLINICAL NURSE SPECIALIST

## 2019-09-27 PROCEDURE — 99242 OFF/OP CONSLTJ NEW/EST SF 20: CPT | Performed by: CLINICAL NURSE SPECIALIST

## 2019-09-27 NOTE — LETTER
9/27/2019         RE: Citlali Adrian  [address] [address]  [place] [address]        Dear Colleague,    Thank you for referring your patient, Citlali Adrian, to the [facility]. Please see a copy of my visit note below.    Duplicate note.    Name: Citlali Adrian  Seen at the request of Haase, Susan Rachele for thyroid nodule.   HPI:  Citlali Adrian is a 44 year old female who presents for the evaluation of thyroid nodule .  She reports noting a feeling of fullness in her lower neck area, voice hoarseness, and the need to clear her throat frequently starting this past July.  These symptoms have persisted without identifiable cause.  Thyroid ultrasound done 9/10/2019 showed a normal size thyroid gland with a single 0.4 cm hypoechoic left lobe thyroid nodule with internal calcification.  She is here today for further evaluation.  No known family history of thyroid disease.    History of radiation exposure: No  History of thyroid dysfunction: No  Palpitations:  Yes, has noted increased palpitations since July 2019.  Diarrhea/Constipation: No  Dysphagia or Shortness of breath: No  Changes in weight: 137 lbs (7/2019) --> 140 lbs  PMH/PSH:  Past Medical History:   Diagnosis Date     Microscopic Hematuria     neg IVP 98 and neg US 97     Psoriasis     mild     Past Surgical History:   Procedure Laterality Date     D & C       Family Hx:  Family History   Problem Relation Age of Onset     Hypertension Mother      Lipids Mother      Cerebrovascular Disease Mother      Cancer - colorectal Father         age 78     Breast Cancer Maternal Grandmother         post-menopausal     Hypertension Brother      Lipids Brother      Diabetes Maternal Uncle      Genitourinary Problems Brother         kidney stones - age 50s     Hypertension Sister         50s     Thyroid disease: No        DM2:          Autoimmune: DM1, SLE, RA, Vitiligo     Social Hx:  Social History     Socioeconomic History     Marital status:       Spouse name: Dar     Number of children: 1     Years of education: Not on file     Highest education level: GED or equivalent   Occupational History     Occupation:      Employer: [employer]   Social Needs     Financial resource strain: Not hard at all     Food insecurity:     Worry: Never true     Inability: Never true     Transportation needs:     Medical: No     Non-medical: No   Tobacco Use     Smoking status: Never Smoker     Smokeless tobacco: Never Used   Substance and Sexual Activity     Alcohol use: Yes     Frequency: Monthly or less     Drinks per session: 3 or 4     Binge frequency: Never     Comment: socially     Drug use: No     Sexual activity: Yes     Partners: Male     Birth control/protection: Condom     Comment: condom   Lifestyle     Physical activity:     Days per week: 3 days     Minutes per session: 50 min     Stress: Not on file   Relationships     Social connections:     Talks on phone: More than three times a week     Gets together: Never     Attends Jew service: 1 to 4 times per year     Active member of club or organization: No     Attends meetings of clubs or organizations: Patient refused     Relationship status:      Intimate partner violence:     Fear of current or ex partner: Not on file     Emotionally abused: Not on file     Physically abused: Not on file     Forced sexual activity: Not on file   Other Topics Concern     Parent/sibling w/ CABG, MI or angioplasty before 65F 55M? Not Asked   Social History Narrative     Not on file          MEDICATIONS:  has a current medication list which includes the following prescription(s): clobetasol propionate.    Review of Systems  10 point ROS neg other than the symptoms noted above in the HPI.    Physical Exam   VS: /77 (BP Location: Right arm, Patient Position: Chair, Cuff Size: Adult Regular)   Pulse 66   Temp 98.4  F (36.9  C) (Oral)   Resp 12   Wt 63.5 kg (140 lb)    LMP 09/24/2019 (Approximate)   Breastfeeding? No   BMI 23.66 kg/m     GENERAL: AXOX3, NAD, well dressed, answering questions appropriately, appears stated age.  HEENT: no exophthalmos, no proptosis, no lig lag, no retraction  NECK:  Supple, thyroid gland palpably normal, no palpable nodules, no adenopathy  CV: RRR  LUNGS: Clear  EXTREMITIES: no edema, +pulses, no rashes, no lesions  NEUROLOGY: CN grossly intact, + DTR upper and lower extremity, no tremors  MSK: grossly intact    LABS:  TFTs:  ENDO THYROID LABS-[facility] Latest Ref Rng & Units 8/30/2019 5/24/2017   TSH 0.40 - 4.00 mU/L 2.46 1.97   T4 FREE 0.76 - 1.46 ng/dL     TRIIODOTHYRONINE(T3) 60 - 181 ng/dL     THYROGLOBULIN ANTIBODY <40 IU/mL <20      ENDO THYROID LABS-[facility] Latest Ref Rng & Units 11/23/2015   TSH 0.40 - 4.00 mU/L 1.77   T4 FREE 0.76 - 1.46 ng/dL 0.96   TRIIODOTHYRONINE(T3) 60 - 181 ng/dL 106   THYROGLOBULIN ANTIBODY <40 IU/mL      Thyroid Ultrasound:  US THYROID   9/10/2019      HISTORY: Dysphagia, unspecified type     COMPARISON: None available     FINDINGS:  Right lobe of the thyroid gland measures 4.2 x 1.4 x 1.3  cm. Left lobe of the thyroid gland measures 4.2 x 1.2 x 1.3 cm.  Isthmus measures 0.2 cm. A 0.4 cm hypoechoic solid nodule with  internal, punctate calcification is present in the left lobe.                                                                      IMPRESSION: 0.4 cm hypoechoic, solid nodule with internal punctate  calcification in the left lobe of the thyroid gland. Continued  follow-up recommended.  All pertinent notes, labs, and images personally reviewed by me.     A/P  Ms.Kari ARETHA Adrian is a 44 year old here for the evaluation of thyroid nodule:    Thyroid nodules are common and are frequently benign. Data suggest that the prevalence of palpable thyroid nodules is 3% to 7% in [place]; the prevalence is as high as 50% based on ultrasonography (US) or autopsy data. All patients with a palpable thyroid nodule, however,  should undergo US examination. US-guided FNA (US-FNA) is recommended for nodules ?10 mm; US-FNA is suggested for nodules <10 mm only if clinical information or US features are suspicious.    Causes of thyroid Nodules: Benign (Multinodular goiter, Hashimoto s thyroiditis, Simple or hemorrhagic cysts, Follicular adenomas, Subacute thyroiditis) or Malignant(Papillary carcinoma, Follicular carcinoma, Hürthle cell carcinoma, Medullary carcinoma, Anaplastic carcinoma, Primary thyroid lymphoma, or Metastatic malignant lesion).    MultiNodule:  The risk of cancer is not significantly higher in palpable solitary thyroid nodules than in multinodular lesions or in nodules in diffuse goiters. In multinodular thyroid glands, the cytologic sampling should be focused on lesions characterized by suspicious US features rather than on larger or clinically dominant nodules.    Cyst:  Most complex thyroid nodules with a dominant fluid component are benign. USFNA, however, should always be performed because the rare papillary thyroid carcinoma (PTC) can be cystic. An unsatisfactory (nondiagnostic) specimen usually results from a cystic nodule that yields few or no follicular cells. Reaspiration yields satisfactory results in 50% of cases.    Fine-Needle Aspiration Cytologic Diagnosis: 70% of FNA specimens are classified as benign; in addition, 5% are malignant, 10% are suspicious, and 10% to 20% are nondiagnostic or unsatisfactory. At surgical intervention, about 20% of such indeterminate/suspicious specimens are found to be malignant lesions.    Despite good initial technique, repeated biopsy, and US-FNA, approximately 5% of nodules still remain nondiagnostic. Such thyroid nodules should be surgically excised.    An incidental thyroid nodule without microcalcifications measuring <1.0 cm in size  in a patient <35 years old or <1.5 cm in a patient >35 years old is most likely benign and does not typically require follow-up.    I spoke  with Dr. Randy Amador, the radiologist who read the thyroid ultrasound.  He did not feel the nodule was large enough for FNA biopsy.  Will plan to monitor the nodule for growth/changes with repeat thyroid ultrasound in 6 months.  Will also obtain TPO antibodies today to rule out autoimmune thyroid disease.    Labs ordered today:   Orders Placed This Encounter   Procedures     US Thyroid     Thyroid peroxidase antibody     Radiology/Consults ordered today: US THYROID    More than 50% of the time spent with Ms. Adrian on counseling / coordinating her care.Total face to face time was 25 minutes.      Follow-up:  TBD based on repeat thyroid ultrasound results.    Ernestine Cardozo NP  Endocrinology  [facility]  CC: Haase, Susan Rachele    Again, thank you for allowing me to participate in the care of your patient.        Sincerely,        ANN Gonsalves CNP

## 2019-09-27 NOTE — PATIENT INSTRUCTIONS
I'll check with the radiologist who read your thyroid ultrasound to see if the nodule is completely solid and to clarify the calcification seen is in the nodule.  I'll also see if he thinks the nodule is amiable to needle biopsy.    The plan will be to repeat the ultrasound in 6 months unless the radiologist feels the nodule could possibly be biopsied at this time.  I'll also forward a copy of my note to Dr. Askew (the endocrinologist I work with) for her opinion/recommendations.    I'm also check TPO antibodies to rule out Hashimoto's autoimmune thyroid disease.  Hashimoto's is the most common cause of hypothyroidism but does not have any increased risk of thyroid cancer.    Ernestine Cardozo NP  Endocrinology

## 2019-09-28 ENCOUNTER — HEALTH MAINTENANCE LETTER (OUTPATIENT)
Age: 44
End: 2019-09-28

## 2019-09-30 ENCOUNTER — TRANSFERRED RECORDS (OUTPATIENT)
Dept: HEALTH INFORMATION MANAGEMENT | Facility: CLINIC | Age: 44
End: 2019-09-30

## 2019-09-30 LAB — THYROPEROXIDASE AB SERPL-ACNC: <10 IU/ML

## 2019-10-01 NOTE — PROGRESS NOTES
Name: Citlali Adrian  Seen at the request of Haase, Susan Rachele for thyroid nodule.   HPI:  Citlali Adrian is a 44 year old female who presents for the evaluation of thyroid nodule .  She reports noting a feeling of fullness in her lower neck area, voice hoarseness, and the need to clear her throat frequently starting this past July.  These symptoms have persisted without identifiable cause.  Thyroid ultrasound done 9/10/2019 showed a normal size thyroid gland with a single 0.4 cm hypoechoic left lobe thyroid nodule with internal calcification.  She is here today for further evaluation.  No known family history of thyroid disease.    History of radiation exposure: No  History of thyroid dysfunction: No  Palpitations:  Yes, has noted increased palpitations since July 2019.  Diarrhea/Constipation: No  Dysphagia or Shortness of breath: No  Changes in weight: 137 lbs (7/2019) --> 140 lbs  PMH/PSH:  Past Medical History:   Diagnosis Date     Microscopic Hematuria     neg IVP 98 and neg US 97     Psoriasis     mild     Past Surgical History:   Procedure Laterality Date     D & C       Family Hx:  Family History   Problem Relation Age of Onset     Hypertension Mother      Lipids Mother      Cerebrovascular Disease Mother      Cancer - colorectal Father         age 78     Breast Cancer Maternal Grandmother         post-menopausal     Hypertension Brother      Lipids Brother      Diabetes Maternal Uncle      Genitourinary Problems Brother         kidney stones - age 50s     Hypertension Sister         50s     Thyroid disease: No        DM2:          Autoimmune: DM1, SLE, RA, Vitiligo     Social Hx:  Social History     Socioeconomic History     Marital status:      Spouse name: Dar     Number of children: 1     Years of education: Not on file     Highest education level: GED or equivalent   Occupational History     Occupation:      Employer: [employer]   Social Needs     Financial  resource strain: Not hard at all     Food insecurity:     Worry: Never true     Inability: Never true     Transportation needs:     Medical: No     Non-medical: No   Tobacco Use     Smoking status: Never Smoker     Smokeless tobacco: Never Used   Substance and Sexual Activity     Alcohol use: Yes     Frequency: Monthly or less     Drinks per session: 3 or 4     Binge frequency: Never     Comment: socially     Drug use: No     Sexual activity: Yes     Partners: Male     Birth control/protection: Condom     Comment: condom   Lifestyle     Physical activity:     Days per week: 3 days     Minutes per session: 50 min     Stress: Not on file   Relationships     Social connections:     Talks on phone: More than three times a week     Gets together: Never     Attends Mandaeism service: 1 to 4 times per year     Active member of club or organization: No     Attends meetings of clubs or organizations: Patient refused     Relationship status:      Intimate partner violence:     Fear of current or ex partner: Not on file     Emotionally abused: Not on file     Physically abused: Not on file     Forced sexual activity: Not on file   Other Topics Concern     Parent/sibling w/ CABG, MI or angioplasty before 65F 55M? Not Asked   Social History Narrative     Not on file          MEDICATIONS:  has a current medication list which includes the following prescription(s): clobetasol propionate.    Review of Systems  10 point ROS neg other than the symptoms noted above in the HPI.    Physical Exam   VS: /77 (BP Location: Right arm, Patient Position: Chair, Cuff Size: Adult Regular)   Pulse 66   Temp 98.4  F (36.9  C) (Oral)   Resp 12   Wt 63.5 kg (140 lb)   LMP 09/24/2019 (Approximate)   Breastfeeding? No   BMI 23.66 kg/m    GENERAL: AXOX3, NAD, well dressed, answering questions appropriately, appears stated age.  HEENT: no exophthalmos, no proptosis, no lig lag, no retraction  NECK:  Supple, thyroid gland palpably  normal, no palpable nodules, no adenopathy  CV: RRR  LUNGS: Clear  EXTREMITIES: no edema, +pulses, no rashes, no lesions  NEUROLOGY: CN grossly intact, + DTR upper and lower extremity, no tremors  MSK: grossly intact    LABS:  TFTs:  ENDO THYROID LABS-[facility] Latest Ref Rng & Units 8/30/2019 5/24/2017   TSH 0.40 - 4.00 mU/L 2.46 1.97   T4 FREE 0.76 - 1.46 ng/dL     TRIIODOTHYRONINE(T3) 60 - 181 ng/dL     THYROGLOBULIN ANTIBODY <40 IU/mL <20      ENDO THYROID LABS-[facility] Latest Ref Rng & Units 11/23/2015   TSH 0.40 - 4.00 mU/L 1.77   T4 FREE 0.76 - 1.46 ng/dL 0.96   TRIIODOTHYRONINE(T3) 60 - 181 ng/dL 106   THYROGLOBULIN ANTIBODY <40 IU/mL      Thyroid Ultrasound:  US THYROID   9/10/2019      HISTORY: Dysphagia, unspecified type     COMPARISON: None available     FINDINGS:  Right lobe of the thyroid gland measures 4.2 x 1.4 x 1.3  cm. Left lobe of the thyroid gland measures 4.2 x 1.2 x 1.3 cm.  Isthmus measures 0.2 cm. A 0.4 cm hypoechoic solid nodule with  internal, punctate calcification is present in the left lobe.                                                                      IMPRESSION: 0.4 cm hypoechoic, solid nodule with internal punctate  calcification in the left lobe of the thyroid gland. Continued  follow-up recommended.  All pertinent notes, labs, and images personally reviewed by me.     A/P  Ms.Kari ARETHA Adrian is a 44 year old here for the evaluation of thyroid nodule:    Thyroid nodules are common and are frequently benign. Data suggest that the prevalence of palpable thyroid nodules is 3% to 7% in [place]; the prevalence is as high as 50% based on ultrasonography (US) or autopsy data. All patients with a palpable thyroid nodule, however, should undergo US examination. US-guided FNA (US-FNA) is recommended for nodules ?10 mm; US-FNA is suggested for nodules <10 mm only if clinical information or US features are suspicious.    Causes of thyroid Nodules: Benign (Multinodular goiter, Hashimoto s  thyroiditis, Simple or hemorrhagic cysts, Follicular adenomas, Subacute thyroiditis) or Malignant(Papillary carcinoma, Follicular carcinoma, Hürthle cell carcinoma, Medullary carcinoma, Anaplastic carcinoma, Primary thyroid lymphoma, or Metastatic malignant lesion).    MultiNodule:  The risk of cancer is not significantly higher in palpable solitary thyroid nodules than in multinodular lesions or in nodules in diffuse goiters. In multinodular thyroid glands, the cytologic sampling should be focused on lesions characterized by suspicious US features rather than on larger or clinically dominant nodules.    Cyst:  Most complex thyroid nodules with a dominant fluid component are benign. USFNA, however, should always be performed because the rare papillary thyroid carcinoma (PTC) can be cystic. An unsatisfactory (nondiagnostic) specimen usually results from a cystic nodule that yields few or no follicular cells. Reaspiration yields satisfactory results in 50% of cases.    Fine-Needle Aspiration Cytologic Diagnosis: 70% of FNA specimens are classified as benign; in addition, 5% are malignant, 10% are suspicious, and 10% to 20% are nondiagnostic or unsatisfactory. At surgical intervention, about 20% of such indeterminate/suspicious specimens are found to be malignant lesions.    Despite good initial technique, repeated biopsy, and US-FNA, approximately 5% of nodules still remain nondiagnostic. Such thyroid nodules should be surgically excised.    An incidental thyroid nodule without microcalcifications measuring <1.0 cm in size  in a patient <35 years old or <1.5 cm in a patient >35 years old is most likely benign and does not typically require follow-up.    I spoke with Dr. Randy Amador, the radiologist who read the thyroid ultrasound.  He did not feel the nodule was large enough for FNA biopsy.  Will plan to monitor the nodule for growth/changes with repeat thyroid ultrasound in 6 months.  Will also obtain TPO  antibodies today to rule out autoimmune thyroid disease.    Labs ordered today:   Orders Placed This Encounter   Procedures     US Thyroid     Thyroid peroxidase antibody     Radiology/Consults ordered today: US THYROID    More than 50% of the time spent with Ms. Adrian on counseling / coordinating her care.Total face to face time was 25 minutes.      Follow-up:  TBD based on repeat thyroid ultrasound results.    Ernestine Cardozo NP  Endocrinology  [facility]  CC: Haase, Susan Rachele

## 2019-10-01 NOTE — RESULT ENCOUNTER NOTE
Citlali,  The TPO antibodies were not elevated so no indication of autoimmune thyroid disease.  Here's a copy of the results for your records.  Ernestine Cardozo NP  Endocrinology

## 2020-01-20 ENCOUNTER — MYC MEDICAL ADVICE (OUTPATIENT)
Dept: FAMILY MEDICINE | Facility: CLINIC | Age: 45
End: 2020-01-20

## 2020-01-20 DIAGNOSIS — E04.1 THYROID NODULE: Primary | ICD-10-CM

## 2020-01-20 NOTE — TELEPHONE ENCOUNTER
Ernestine Cardozo CNP -   Please see my chart message, you saw patient on 9/27/2019 with instruction to repeat thyroid ultrasound in 6 months, do you want to order? Or see patient first?   Please review pended order and sign if okay     Cheyanne Chaney, Registered Nurse   [facility]

## 2020-01-23 NOTE — TELEPHONE ENCOUNTER
Yes, the ultrasound should be repeated in March 2020.  I signed the order.  Thanks,  Ernestine Cardozo NP  Endocrinology

## 2020-03-11 ENCOUNTER — MYC MEDICAL ADVICE (OUTPATIENT)
Dept: ENDOCRINOLOGY | Facility: CLINIC | Age: 45
End: 2020-03-11

## 2020-03-11 ENCOUNTER — MYC MEDICAL ADVICE (OUTPATIENT)
Dept: FAMILY MEDICINE | Facility: CLINIC | Age: 45
End: 2020-03-11

## 2020-03-11 ENCOUNTER — HOSPITAL ENCOUNTER (OUTPATIENT)
Dept: ULTRASOUND IMAGING | Facility: CLINIC | Age: 45
Discharge: HOME | End: 2020-03-11
Attending: CLINICAL NURSE SPECIALIST | Admitting: CLINICAL NURSE SPECIALIST
Payer: COMMERCIAL

## 2020-03-11 DIAGNOSIS — E04.1 THYROID NODULE: Primary | ICD-10-CM

## 2020-03-11 DIAGNOSIS — E04.1 THYROID NODULE: ICD-10-CM

## 2020-03-11 PROCEDURE — 76536 US EXAM OF HEAD AND NECK: CPT

## 2020-03-11 NOTE — RESULT ENCOUNTER NOTE
Citlali,  Your left lobe thyroid nodule is stable, no increase in size noted.  This is certainly reassuring.   The increased doppler flow is likely due to inflammation.  The most common reason for inflammation is autoimmune thyroid disease which you could still have even though your blood tests did not show any elevated thyroid antibodies.  It's hard to know what the cause is at this point but it isn't anything serious or anything that needs treatment at this time.    I would recommend repeating the thyroid ultrasound in another 1-2 years.  Here's a copy of the results for your records.  Please let me know if you have any questions or concerns.  Ernestine Cardozo NP  Endocrinology

## 2020-04-02 ENCOUNTER — E-VISIT (OUTPATIENT)
Dept: FAMILY MEDICINE | Facility: CLINIC | Age: 45
End: 2020-04-02
Payer: COMMERCIAL

## 2020-04-02 ENCOUNTER — MYC MEDICAL ADVICE (OUTPATIENT)
Dept: FAMILY MEDICINE | Facility: CLINIC | Age: 45
End: 2020-04-02

## 2020-04-02 DIAGNOSIS — Z53.9 ERRONEOUS ENCOUNTER--DISREGARD: Primary | ICD-10-CM

## 2020-04-03 ENCOUNTER — MYC MEDICAL ADVICE (OUTPATIENT)
Dept: FAMILY MEDICINE | Facility: CLINIC | Age: 45
End: 2020-04-03

## 2020-04-03 ENCOUNTER — OFFICE VISIT (OUTPATIENT)
Dept: FAMILY MEDICINE | Facility: CLINIC | Age: 45
End: 2020-04-03
Payer: COMMERCIAL

## 2020-04-03 VITALS
WEIGHT: 139 LBS | BODY MASS INDEX: 23.49 KG/M2 | DIASTOLIC BLOOD PRESSURE: 70 MMHG | TEMPERATURE: 97.6 F | RESPIRATION RATE: 16 BRPM | OXYGEN SATURATION: 100 % | SYSTOLIC BLOOD PRESSURE: 102 MMHG | HEART RATE: 75 BPM

## 2020-04-03 DIAGNOSIS — T14.8XXA OPEN WOUND: Primary | ICD-10-CM

## 2020-04-03 DIAGNOSIS — R06.00 DYSPNEA, UNSPECIFIED TYPE: ICD-10-CM

## 2020-04-03 PROCEDURE — 99214 OFFICE O/P EST MOD 30 MIN: CPT | Performed by: NURSE PRACTITIONER

## 2020-04-03 NOTE — PROGRESS NOTES
"Subjective     Citlali Adrian is a 44 year old female who presents to clinic today for the following health issues:    HPI   Concern - Sore on left foot  Onset: X 1 + week    Description:   Painful spot on bottom of left foot, notes \"oozing\", becoming \"deeper\", denies any bloody discharge, only  Clear to yellow color, a little smaller then dime size for wound    Intensity: moderate, severe    Progression of Symptoms:  Improving-started today    Accompanying Signs & Symptoms:  none    Previous history of similar problem:   none    Precipitating factors:   Worsened by: covering with Bacitracin caused skin around site \"soft\"    Alleviating factors:  Improved by: Alcohol cleanses and keeping it covered with band aid    Therapies Tried and outcome: Cleaning cotton swab and rubbing alcohol    Concern - Chronic SOB  Onset: X 3 weeks currently, originally started around 2015    Description:   SOB, difficult to take deep breath's, has had previous work up's for this, \"Feels like I'm breathing through a straw\" notes pain in sternum and back.  Feels muscular as certain movements cause more pain    Intensity: moderate    Progression of Symptoms:  intermittent    Accompanying Signs & Symptoms:  none    Previous history of similar problem:   none    Precipitating factors:   Worsened by: occasionally worse with minimal strenuous activity    Alleviating factors:  Improved by: none    Therapies Tried and outcome: CXR, CT, [place], EKG, Stress tests, Pulmonary specialist   Has aches in sternum in the front and back, this issue has been ongoing for several years has had extensive work up completed in the past with normal findings.  Has tried ibuprofen with some pain.      Patient Active Problem List   Diagnosis     Other psoriasis     Abdominal pain, right upper quadrant     Performance anxiety     CARDIOVASCULAR SCREENING; LDL GOAL LESS THAN 160     Shoulder impingement syndrome     Fatty liver     Family hx-stroke (mom)     " Hydronephrosis, unspecified hydronephrosis type     Post-nasal drip     Thyroid nodule     Past Surgical History:   Procedure Laterality Date     D & C         Social History     Tobacco Use     Smoking status: Never Smoker     Smokeless tobacco: Never Used   Substance Use Topics     Alcohol use: Yes     Frequency: Monthly or less     Drinks per session: 3 or 4     Binge frequency: Never     Comment: socially     Family History   Problem Relation Age of Onset     Hypertension Mother      Lipids Mother      Cerebrovascular Disease Mother      Cancer - colorectal Father         age 78     Heart Failure Brother      Breast Cancer Maternal Grandmother         post-menopausal     Hypertension Brother      Coronary Artery Disease Brother      Lipids Brother      Diabetes Maternal Uncle      Genitourinary Problems Brother         kidney stones - age 50s     Hypertension Sister         50s         Current Outpatient Medications   Medication Sig Dispense Refill     clobetasol propionate 0.05 % LIQD APPLY TO SCALP EVERY DAY  10     Allergies   Allergen Reactions     Hydrogen Peroxide      No Clinical Screening - See Comments      Hibaclense     No Known Allergies      Reviewed and updated as needed this visit by Provider         Review of Systems     ROS COMP: CONSTITUTIONAL: NEGATIVE for fever, chills, change in weight  ENT/MOUTH: NEGATIVE for ear, mouth and throat problems  RESP: NEGATIVE for significant cough.  See HPI  CV: NEGATIVE for chest pain, palpitations or peripheral edema  MUSCULOSKELETAL: see HPI  PSYCHIATRIC: NEGATIVE for changes in mood or affect      Objective    /70 (BP Location: Right arm, Patient Position: Chair, Cuff Size: Adult Regular)   Pulse 75   Temp 97.6  F (36.4  C) (Oral)   Resp 16   Wt 63 kg (139 lb)   LMP  (LMP Unknown)   SpO2 100%   BMI 23.49 kg/m    Body mass index is 23.49 kg/m .  Physical Exam   GENERAL: healthy, alert and no distress  HENT: ear canals and TM's normal, nose and  mouth without ulcers or lesions  NECK: no adenopathy, no asymmetry, masses, or scars and thyroid normal to palpation  RESP: lungs clear to auscultation - no rales, rhonchi or wheezes  CV: regular rate and rhythm, normal S1 S2, no S3 or S4, no murmur, click or rub, no peripheral edema   MS: ball of left foot with a 1 cm open area, tender to touch, no bleeding or drainage, no surrounding redness or warmth.   PSYCH: mentation appears normal, affect normal/bright          Assessment & Plan   Assessment  Citlali was seen today for sore and breathing problem.    Diagnoses and all orders for this visit:    Open wound:  Ball of left foot, no signs of infection, appears to be a callus that patient has removed a large amount of skin from.  Discussed need to continue to soak, use pumice stone.      Dyspnea, unspecified type:  Has had significant work up in the past with normal findings, today exam normal, oxygen sats 100%,, xray is not indicated, we are not able to complete spirometry in clinic due to COVID.  Since ongoing symptoms will refer to pulmonary  To have further evaluation completed in the future if needed.    -     PULMONARY MEDICINE REFERRAL    Return in about 3 months (around 7/3/2020) for Physical Exam.    Susan Haase, APRN [facility]

## 2020-04-03 NOTE — TELEPHONE ENCOUNTER
Can you call Citlali and see if she can come in at 2:40 for a visit with me, make sure and screen her for COVID.  Thanks,  Susan Haase, CNP

## 2020-04-04 ENCOUNTER — MYC MEDICAL ADVICE (OUTPATIENT)
Dept: FAMILY MEDICINE | Facility: CLINIC | Age: 45
End: 2020-04-04

## 2020-04-04 DIAGNOSIS — Z20.818 EXPOSURE TO STREP THROAT: Primary | ICD-10-CM

## 2020-04-08 NOTE — TELEPHONE ENCOUNTER
04/08/2020      Pt called to check on the status of a message in her Mychart for Susan Haase that had been written 04/04/2020. Pt is wondering if one of Dr.Haase's colleagues could answer her question or what would be best.    Ph:[phone]    Haleigh David -Patient Representative

## 2020-04-09 RX ORDER — AMOXICILLIN 500 MG/1
500 CAPSULE ORAL 2 TIMES DAILY
Qty: 20 CAPSULE | Refills: 0 | Status: SHIPPED | OUTPATIENT
Start: 2020-04-09 | End: 2020-04-19

## 2020-04-09 NOTE — TELEPHONE ENCOUNTER
Misty or Cover please see my chart msg below and advise.  Vandana Tiwari, RN      Citlali Adrian [phone]  Haleigh David 16 hours ago (3:38 PM)       Keily Hyman routed conversation to Haase, Susan Rachele, APRN CNP 3 days ago       Petrik, Kari L Haase, Susan Rachele, APRN CNP 5 days ago         Des Jay.     I'm sorry to bother you again, but my  was tested on Tuesday for strep and the rapid came back negative.  He just got a call today saying they just got the culture results back and it was positive for strep.  Because I have psoriasis, when I am exposed to strep it can cause a guttate response and my psoriasis flares up.  My dermatologist has always told me that if I am exposed to strep that I should be treated with antibiotics as well.  Earlier this week, my psoriasis was very inflamed which made me think my  may have had strep to begin with but we just got confirmation today.  My dermatologist is Dr. Lexi Myers with [facility] in [place].  I am reaching out to see if you would prescribe me an antibiotic for this reason.  Thanks.

## 2020-04-20 ENCOUNTER — MYC MEDICAL ADVICE (OUTPATIENT)
Dept: FAMILY MEDICINE | Facility: CLINIC | Age: 45
End: 2020-04-20

## 2020-04-20 DIAGNOSIS — N89.8 VAGINAL DISCHARGE: Primary | ICD-10-CM

## 2020-04-20 RX ORDER — FLUCONAZOLE 150 MG/1
150 TABLET ORAL ONCE
Qty: 1 TABLET | Refills: 0 | Status: SHIPPED | OUTPATIENT
Start: 2020-04-20 | End: 2020-04-20

## 2020-06-12 ENCOUNTER — TRANSFERRED RECORDS (OUTPATIENT)
Dept: HEALTH INFORMATION MANAGEMENT | Facility: CLINIC | Age: 45
End: 2020-06-12

## 2020-07-01 ENCOUNTER — TRANSFERRED RECORDS (OUTPATIENT)
Dept: MULTI SPECIALTY CLINIC | Facility: CLINIC | Age: 45
End: 2020-07-01

## 2020-07-01 LAB — PAP SMEAR - HIM PATIENT REPORTED: NEGATIVE

## 2020-07-28 ENCOUNTER — HOSPITAL ENCOUNTER (OUTPATIENT)
Dept: GENERAL RADIOLOGY | Facility: CLINIC | Age: 45
Discharge: HOME | End: 2020-07-28
Attending: INTERNAL MEDICINE | Admitting: INTERNAL MEDICINE
Payer: COMMERCIAL

## 2020-07-28 DIAGNOSIS — R06.00 DYSPNEA, UNSPECIFIED: ICD-10-CM

## 2020-07-28 PROCEDURE — 76000 FLUOROSCOPY <1 HR PHYS/QHP: CPT

## 2020-07-28 PROCEDURE — 71046 X-RAY EXAM CHEST 2 VIEWS: CPT

## 2020-08-16 ENCOUNTER — HOSPITAL ENCOUNTER (EMERGENCY)
Facility: CLINIC | Age: 45
Discharge: HOME | End: 2020-08-16
Attending: EMERGENCY MEDICINE | Admitting: EMERGENCY MEDICINE
Payer: COMMERCIAL

## 2020-08-16 ENCOUNTER — APPOINTMENT (OUTPATIENT)
Dept: ULTRASOUND IMAGING | Facility: CLINIC | Age: 45
End: 2020-08-16
Attending: EMERGENCY MEDICINE
Payer: COMMERCIAL

## 2020-08-16 VITALS
SYSTOLIC BLOOD PRESSURE: 117 MMHG | DIASTOLIC BLOOD PRESSURE: 40 MMHG | TEMPERATURE: 98 F | RESPIRATION RATE: 18 BRPM | OXYGEN SATURATION: 98 % | HEART RATE: 67 BPM

## 2020-08-16 DIAGNOSIS — N83.202 HEMORRHAGIC CYST OF LEFT OVARY: ICD-10-CM

## 2020-08-16 LAB
ALBUMIN UR-MCNC: NEGATIVE MG/DL
ANION GAP SERPL CALCULATED.3IONS-SCNC: 4 MMOL/L (ref 3–14)
APPEARANCE UR: CLEAR
B-HCG FREE SERPL-ACNC: <5 IU/L
BACTERIA #/AREA URNS HPF: ABNORMAL /HPF
BASOPHILS # BLD AUTO: 0 10E9/L (ref 0–0.2)
BASOPHILS NFR BLD AUTO: 0.8 %
BILIRUB UR QL STRIP: NEGATIVE
BUN SERPL-MCNC: 19 MG/DL (ref 7–30)
CALCIUM SERPL-MCNC: 8.5 MG/DL (ref 8.5–10.1)
CHLORIDE SERPL-SCNC: 110 MMOL/L (ref 94–109)
CO2 SERPL-SCNC: 24 MMOL/L (ref 20–32)
COLOR UR AUTO: ABNORMAL
CREAT SERPL-MCNC: 0.77 MG/DL (ref 0.52–1.04)
DIFFERENTIAL METHOD BLD: NORMAL
EOSINOPHIL # BLD AUTO: 0.1 10E9/L (ref 0–0.7)
EOSINOPHIL NFR BLD AUTO: 2.1 %
ERYTHROCYTE [DISTWIDTH] IN BLOOD BY AUTOMATED COUNT: 12.3 % (ref 10–15)
GFR SERPL CREATININE-BSD FRML MDRD: >90 ML/MIN/{1.73_M2}
GLUCOSE SERPL-MCNC: 101 MG/DL (ref 70–99)
GLUCOSE UR STRIP-MCNC: NEGATIVE MG/DL
HCT VFR BLD AUTO: 38.3 % (ref 35–47)
HGB BLD-MCNC: 12.6 G/DL (ref 11.7–15.7)
HGB UR QL STRIP: ABNORMAL
IMM GRANULOCYTES # BLD: 0 10E9/L (ref 0–0.4)
IMM GRANULOCYTES NFR BLD: 0.2 %
KETONES UR STRIP-MCNC: NEGATIVE MG/DL
LEUKOCYTE ESTERASE UR QL STRIP: NEGATIVE
LYMPHOCYTES # BLD AUTO: 1.5 10E9/L (ref 0.8–5.3)
LYMPHOCYTES NFR BLD AUTO: 28.2 %
MCH RBC QN AUTO: 29.9 PG (ref 26.5–33)
MCHC RBC AUTO-ENTMCNC: 32.9 G/DL (ref 31.5–36.5)
MCV RBC AUTO: 91 FL (ref 78–100)
MONOCYTES # BLD AUTO: 0.4 10E9/L (ref 0–1.3)
MONOCYTES NFR BLD AUTO: 7.9 %
MUCOUS THREADS #/AREA URNS LPF: PRESENT /LPF
NEUTROPHILS # BLD AUTO: 3.2 10E9/L (ref 1.6–8.3)
NEUTROPHILS NFR BLD AUTO: 60.8 %
NITRATE UR QL: NEGATIVE
NRBC # BLD AUTO: 0 10*3/UL
NRBC BLD AUTO-RTO: 0 /100
PH UR STRIP: 6.5 PH (ref 5–7)
PLATELET # BLD AUTO: 256 10E9/L (ref 150–450)
POTASSIUM SERPL-SCNC: 3.7 MMOL/L (ref 3.4–5.3)
RBC # BLD AUTO: 4.22 10E12/L (ref 3.8–5.2)
RBC #/AREA URNS AUTO: 3 /HPF (ref 0–2)
SODIUM SERPL-SCNC: 138 MMOL/L (ref 133–144)
SOURCE: ABNORMAL
SP GR UR STRIP: 1.02 (ref 1–1.03)
SQUAMOUS #/AREA URNS AUTO: 1 /HPF (ref 0–1)
UROBILINOGEN UR STRIP-MCNC: NORMAL MG/DL (ref 0–2)
WBC # BLD AUTO: 5.2 10E9/L (ref 4–11)
WBC #/AREA URNS AUTO: 1 /HPF (ref 0–5)

## 2020-08-16 PROCEDURE — 93976 VASCULAR STUDY: CPT

## 2020-08-16 PROCEDURE — 81001 URINALYSIS AUTO W/SCOPE: CPT | Performed by: EMERGENCY MEDICINE

## 2020-08-16 PROCEDURE — 80048 BASIC METABOLIC PNL TOTAL CA: CPT | Performed by: EMERGENCY MEDICINE

## 2020-08-16 PROCEDURE — 84702 CHORIONIC GONADOTROPIN TEST: CPT

## 2020-08-16 PROCEDURE — 85025 COMPLETE CBC W/AUTO DIFF WBC: CPT | Performed by: EMERGENCY MEDICINE

## 2020-08-16 PROCEDURE — 99284 EMERGENCY DEPT VISIT MOD MDM: CPT | Mod: 25

## 2020-08-16 ASSESSMENT — ENCOUNTER SYMPTOMS
BLOOD IN STOOL: 0
FEVER: 0
CHILLS: 0
NAUSEA: 1
ABDOMINAL PAIN: 1
DYSURIA: 0
WEAKNESS: 1
HEMATURIA: 0
DIARRHEA: 0
CONSTIPATION: 0
BACK PAIN: 0
VOMITING: 0

## 2020-08-16 NOTE — ED PROVIDER NOTES
History     Chief Complaint:  Pelvic Pain      HPI   Citlali Adrian is a 45 year old female with a history of ovarian cysts who presents for evaluation of pelvic pain. Patient reports a sudden onset of left sided abdominal/pelvic pain an hour ago with associated nausea and weakness. The pain then spread across her abdomen that she describes as a burning, sharp pain. Here, the patient states her symptoms have mostly resolved. She denies fever, chills, and vomiting. No vaginal discharge or bleeding, dysuria, hematuria, diarrhea, constipation, bloody stools, and back pain.    Allergies:  Hydrogen peroxide    Medications:    The patient is not currently taking any prescribed medications.     Past Medical History:    Thyroid nodule  Fatty liver  Shoulder impingement syndrome  Psoriasis  Hydronephrosis  Ovarian cysts    Past Surgical History:    D&C    Family History:    HTN - mother, brother, sister  CVD - mother  Lipids - mother, brother  CAD - brother  Kidney stones - brother  Heart failure - brother    Social History:  Smoking status: never smoker  Alcohol use: yes  Drug use: no  The patient presents to the emergency department with her .  PCP: Haase, Susan Rachele  Marital Status:       Review of Systems   Constitutional: Negative for chills and fever.   Gastrointestinal: Positive for abdominal pain and nausea. Negative for blood in stool, constipation, diarrhea and vomiting.   Genitourinary: Positive for pelvic pain. Negative for dysuria, hematuria, vaginal bleeding and vaginal discharge.   Musculoskeletal: Negative for back pain.   Neurological: Positive for weakness.   All other systems reviewed and are negative.    Physical Exam     Patient Vitals for the past 24 hrs:   BP Temp Temp src Pulse Heart Rate Resp SpO2   08/16/20 0900 -- -- -- -- -- -- 98 %   08/16/20 0829 -- 98  F (36.7  C) Oral -- -- -- --   08/16/20 0827 -- -- -- -- -- -- 100 %   08/16/20 0826 117/40 -- -- 67 -- -- --   08/16/20 0752  108/51 -- -- -- 62 18 100 %     Physical Exam   General: Patient is awake, alert and interactive when I enter the room. States pain has significantly improved since arriving to the ED. Appears comfortable    Head: The scalp, face, and head appear normal  Eyes: The pupils are equal, round, and reactive to light. Conjunctivae and sclerae are normal  Neck: Normal range of motion. No anterior cervical lymphadenopathy noted  CV: Regular rate. S1/S2. No murmurs.   Resp: Lungs are clear without wheezes or rales. No respiratory distress.   GI: Abdomen is soft, no rigidity. No evidence of pulsatile mass. No fluid waves or evidence of ascites. No distension. Left lower pelvis pain without tenderness, guarding or rebound. Bowel sounds are normal. No hernias or bruising are noted in detailed exam. No CVA tenderness.     MS: Normal tone. Joints grossly normal without effusions. No asymmetric leg swelling, calf or thigh tenderness.    Skin: No rash or lesions noted. Normal capillary refill noted  Neuro: Speech is normal and fluent. Face is symmetric. Moving all extremities.   Psych:  Normal affect.  Appropriate interactions.    Emergency Department Course   Imaging:  Radiographic findings were communicated with the patient who voiced understanding of the findings.  US Pelvic, Complete w Transvaginal & Abd/Pel Duplex Limited:  IMPRESSION:     1. There is 1.8 cm hypoechoic focus in the left ovary, indeterminate,   could represent hemorrhagic cyst. In addition, there is a 2.6 cm   thick-walled hypoechoic/anechoic cyst in the left ovary, likely   representing a corpus luteal cyst. Recommend follow-up exam with   pelvic ultrasound in six weeks to ensure resolution. as per radiology.       Laboratory:  CBC: WBC: 5.2, HGB: 12.6, PLT: 256  BMP: Glucose 101 (H), Chloride 110 (H), o/w WNL (Creatinine: 0.77)  UA: Blood small, RBC 3 (H), Bacteria few, Mucous present, o/w Negative  ISTAT HCG quantitative pregnancy POCT: <5.0    Emergency  Department Course:  Nursing notes and vitals reviewed. (803) I performed an exam of the patient as documented above.     Blood drawn. Urine sample obtained. This was sent to the lab for further testing, results above.     The patient was sent for a US while in the emergency department, findings above.     1042 I rechecked the patient and discussed the results of her workup thus far.     Findings and plan explained to the Patient. Patient discharged home with instructions regarding supportive care, medications, and reasons to return. The importance of close follow-up was reviewed.     I personally reviewed the laboratory results with the Patient and answered all related questions prior to discharge.     Impression & Plan      Medical Decision Making:  Citlali Adrian is a 45 year old female who presents with left sided pelvic pain.  They look overall well.  Upon initial evaluation she is hemodynamically stable with no vital signs.  She is afebrile.  On physical exam patient does have some pain in the left lower quadrant into her pelvis but no significant tenderness, guarding or rebound.  A broad differential diagnosis was considered including colitis, appendicitis, intestinal cramping, pyelonephritis, UTI, kidney stone, constipation, diverticulitis, volvulus, ileus, obstruction, pregnancy (ectopic or intrauterine), ovarian cyst (enlarged or ruptured), ovarian torsion, PID, etc as possibilities. The workup in the ED shows hemorrhagic ovarian cyst.  She understands this may or not be the source of pain.  No other etiology for the patients pain is found at this point and my suspicion of an intraabdominal catastrophe or other worrisome etiology is very low.  I will not therefore admit for serial exams and further workup.  Patient is hemodynamically stable in ED. Plan is home with recheck by ultrasound of cyst to ensure resolution.  Return for fevers greater than 102, increasing pain, other new symptoms develop.  Abdominal  pain handout given.  Questions were answered.      Diagnosis:    ICD-10-CM    1. Hemorrhagic cyst of left ovary  N83.202        Disposition:  discharged to home  Schuyler Sands  8/16/2020   [facility] EMERGENCY DEPARTMENT  Scribe Disclosure:  I, Schuyler Sands, am serving as a scribe at 8:03 AM on 8/16/2020 to document services personally performed by Chandra Alexander MD based on my observations and the provider's statements to me.        Chandra Alexander MD  08/16/20 110

## 2020-08-16 NOTE — ED AVS SNAPSHOT
[facility]  [address]  [facility] [address]  Phone:  [phone]  Fax:  [phone]                                    Citlali Adrian   MRN: [id number]    Department:  [facility]   Date of Visit:  8/16/2020           After Visit Summary Signature Page    I have received my discharge instructions, and my questions have been answered. I have discussed any challenges I see with this plan with the nurse or doctor.    ..........................................................................................................................................  Patient/Patient Representative Signature      ..........................................................................................................................................  Patient Representative Print Name and Relationship to Patient    ..................................................               ................................................  Date                                   Time    ..........................................................................................................................................  Reviewed by Signature/Title    ...................................................              ..............................................  Date                                               Time          22EPIC Rev 08/18

## 2020-08-16 NOTE — ED TRIAGE NOTES
Patient presents with sudden onset of left-sided pelvic pain about 30 minutes PTA that spread throughout the abdomen. She states pain is better now, 3/10, but does have a history of ovarian cysts.

## 2020-08-23 ENCOUNTER — MYC MEDICAL ADVICE (OUTPATIENT)
Dept: FAMILY MEDICINE | Facility: CLINIC | Age: 45
End: 2020-08-23

## 2020-11-05 ENCOUNTER — OFFICE VISIT (OUTPATIENT)
Dept: FAMILY MEDICINE | Facility: CLINIC | Age: 45
End: 2020-11-05
Payer: COMMERCIAL

## 2020-11-05 VITALS
TEMPERATURE: 97.5 F | WEIGHT: 146 LBS | RESPIRATION RATE: 18 BRPM | HEIGHT: 64 IN | SYSTOLIC BLOOD PRESSURE: 114 MMHG | HEART RATE: 71 BPM | OXYGEN SATURATION: 100 % | BODY MASS INDEX: 24.92 KG/M2 | DIASTOLIC BLOOD PRESSURE: 77 MMHG

## 2020-11-05 DIAGNOSIS — Z53.9 ERRONEOUS ENCOUNTER--DISREGARD: Primary | ICD-10-CM

## 2020-11-05 DIAGNOSIS — Z11.4 SCREENING FOR HIV (HUMAN IMMUNODEFICIENCY VIRUS): ICD-10-CM

## 2020-11-05 DIAGNOSIS — Z12.4 SCREENING FOR MALIGNANT NEOPLASM OF CERVIX: ICD-10-CM

## 2020-11-05 DIAGNOSIS — Z11.59 NEED FOR HEPATITIS C SCREENING TEST: ICD-10-CM

## 2020-11-05 ASSESSMENT — MIFFLIN-ST. JEOR: SCORE: 1287.25

## 2020-11-05 ASSESSMENT — PAIN SCALES - GENERAL: PAINLEVEL: MILD PAIN (2)

## 2020-11-05 NOTE — PROGRESS NOTES
Subjective     Citlali Adrian is a 45 year old female who presents to clinic today for the following health issues:    HPI         Dizziness  Onset/Duration: on and off for four days  Description:   Do you feel faint: no  Does it feel like the surroundings (bed, room) are moving: YES  Unsteady/off balance: YES  Have you passed out or fallen: no  Intensity: 7/10  Progression of Symptoms: same  Accompanying Signs & Symptoms:  Heart palpitations or chest pain: no  Nausea, vomiting: no  Weakness or lack of coordination in arms or legs: no  Vision or speech changes: YES, vision   Numbness or tingling: no  Ringing in ears (Tinnitus): YES  Hearing Loss: no  History:   Head trauma/concussion history: no  Previous similar symptoms: no  Recent bleeding history: no  Any new medications (BP?): no  Precipitating factors:   Worse with activity: YES  Worse with head movement: YES  Alleviating factors:   Does staying in a fixed position give relief: YES  Therapies tried and outcome: no    {additonal problems for provider to add (Optional):272998}  No future appointments.  Appointment Notes for this encounter:   Experiencing Dizziness, fogginess, and some vision issues    Health Maintenance Due   Topic Date Due     ANNUAL REVIEW OF HM ORDERS  1975     HIV SCREENING  05/28/1989     HEPATITIS C SCREENING  05/28/1992     PAP  04/28/2020     PREVENTIVE CARE VISIT  08/30/2020     Health Maintenance addressed:  {HMLIST:546471}    {Outcome:634787}    MyChart Status:  {Mychart Status:727531}  Review of Systems   {ROS COMP (Optional):632560}      Objective    There were no vitals taken for this visit.  There is no height or weight on file to calculate BMI.  Physical Exam   {Exam List (Optional):506112}    {Diagnostic Test Results (Optional):689287}        {PROVIDER CHARTING PREFERENCE:364500}

## 2020-11-06 ENCOUNTER — OFFICE VISIT (OUTPATIENT)
Dept: FAMILY MEDICINE | Facility: CLINIC | Age: 45
End: 2020-11-06
Payer: COMMERCIAL

## 2020-11-06 VITALS
HEART RATE: 72 BPM | SYSTOLIC BLOOD PRESSURE: 94 MMHG | BODY MASS INDEX: 25.06 KG/M2 | TEMPERATURE: 98.6 F | OXYGEN SATURATION: 99 % | WEIGHT: 146 LBS | DIASTOLIC BLOOD PRESSURE: 72 MMHG

## 2020-11-06 DIAGNOSIS — F40.240 CLAUSTROPHOBIA: ICD-10-CM

## 2020-11-06 DIAGNOSIS — R51.9 ACUTE NONINTRACTABLE HEADACHE, UNSPECIFIED HEADACHE TYPE: ICD-10-CM

## 2020-11-06 DIAGNOSIS — R42 DIZZINESS: Primary | ICD-10-CM

## 2020-11-06 DIAGNOSIS — E04.1 THYROID NODULE: ICD-10-CM

## 2020-11-06 LAB
ALBUMIN SERPL-MCNC: 4.5 G/DL (ref 3.4–5)
ALP SERPL-CCNC: 58 U/L (ref 40–150)
ALT SERPL W P-5'-P-CCNC: 19 U/L (ref 0–50)
ANION GAP SERPL CALCULATED.3IONS-SCNC: 5 MMOL/L (ref 3–14)
AST SERPL W P-5'-P-CCNC: 14 U/L (ref 0–45)
BASOPHILS # BLD AUTO: 0 10E9/L (ref 0–0.2)
BASOPHILS NFR BLD AUTO: 0.6 %
BILIRUB SERPL-MCNC: 0.5 MG/DL (ref 0.2–1.3)
BUN SERPL-MCNC: 17 MG/DL (ref 7–30)
CALCIUM SERPL-MCNC: 9.8 MG/DL (ref 8.5–10.1)
CHLORIDE SERPL-SCNC: 107 MMOL/L (ref 94–109)
CO2 SERPL-SCNC: 26 MMOL/L (ref 20–32)
CREAT SERPL-MCNC: 0.64 MG/DL (ref 0.52–1.04)
DIFFERENTIAL METHOD BLD: NORMAL
EOSINOPHIL # BLD AUTO: 0 10E9/L (ref 0–0.7)
EOSINOPHIL NFR BLD AUTO: 0.6 %
ERYTHROCYTE [DISTWIDTH] IN BLOOD BY AUTOMATED COUNT: 12.5 % (ref 10–15)
GFR SERPL CREATININE-BSD FRML MDRD: >90 ML/MIN/{1.73_M2}
GLUCOSE SERPL-MCNC: 99 MG/DL (ref 70–99)
HCT VFR BLD AUTO: 38.6 % (ref 35–47)
HGB BLD-MCNC: 13.1 G/DL (ref 11.7–15.7)
LYMPHOCYTES # BLD AUTO: 1.9 10E9/L (ref 0.8–5.3)
LYMPHOCYTES NFR BLD AUTO: 30.9 %
MCH RBC QN AUTO: 30.1 PG (ref 26.5–33)
MCHC RBC AUTO-ENTMCNC: 33.9 G/DL (ref 31.5–36.5)
MCV RBC AUTO: 89 FL (ref 78–100)
MONOCYTES # BLD AUTO: 0.5 10E9/L (ref 0–1.3)
MONOCYTES NFR BLD AUTO: 8.1 %
NEUTROPHILS # BLD AUTO: 3.7 10E9/L (ref 1.6–8.3)
NEUTROPHILS NFR BLD AUTO: 59.8 %
PLATELET # BLD AUTO: 290 10E9/L (ref 150–450)
POTASSIUM SERPL-SCNC: 3.6 MMOL/L (ref 3.4–5.3)
PROT SERPL-MCNC: 8.2 G/DL (ref 6.8–8.8)
RBC # BLD AUTO: 4.35 10E12/L (ref 3.8–5.2)
SODIUM SERPL-SCNC: 138 MMOL/L (ref 133–144)
TSH SERPL DL<=0.005 MIU/L-ACNC: 2.06 MU/L (ref 0.4–4)
WBC # BLD AUTO: 6.3 10E9/L (ref 4–11)

## 2020-11-06 PROCEDURE — 86800 THYROGLOBULIN ANTIBODY: CPT | Performed by: NURSE PRACTITIONER

## 2020-11-06 PROCEDURE — 80050 GENERAL HEALTH PANEL: CPT | Performed by: NURSE PRACTITIONER

## 2020-11-06 PROCEDURE — 99214 OFFICE O/P EST MOD 30 MIN: CPT | Performed by: NURSE PRACTITIONER

## 2020-11-06 PROCEDURE — 86376 MICROSOMAL ANTIBODY EACH: CPT | Performed by: NURSE PRACTITIONER

## 2020-11-06 PROCEDURE — 36415 COLL VENOUS BLD VENIPUNCTURE: CPT | Performed by: NURSE PRACTITIONER

## 2020-11-06 RX ORDER — ALPRAZOLAM 0.5 MG
TABLET ORAL
Qty: 4 TABLET | Refills: 0 | Status: SHIPPED | OUTPATIENT
Start: 2020-11-06 | End: 2021-01-21

## 2020-11-06 NOTE — Clinical Note
Please abstract the following data from this visit with this patient into the appropriate field in Epic:    Tests that can be patient reported without a hard copy:    Pap smear done on this date: 7/2020 (approximately), by this group: [facility], results were normal.     Other Tests found in the patient's chart through Chart Review/Care Everywhere:        Note to Abstraction: If this section is blank, no results were found via Chart Review/Care Everywhere.

## 2020-11-06 NOTE — PROGRESS NOTES
Subjective     Citlali Adrian is a 45 year old female who presents to clinic today for the following health issues:    HPI         Dizziness  Onset/Duration: on and off for four days   Description:   Do you feel faint: no  Does it feel like the surroundings (bed, room) are moving: YES  Unsteady/off balance: YES  Have you passed out or fallen: no  Intensity: 7/10  Progression of Symptoms: same  Accompanying Signs & Symptoms:  Heart palpitations or chest pain: no  Nausea, vomiting: no  Weakness or lack of coordination in arms or legs: YES  Vision or speech changes: YES  Numbness or tingling: no  Ringing in ears (Tinnitus): YES  Hearing Loss: no  History:   Head trauma/concussion history: no  Previous similar symptoms: no  Recent bleeding history: no  Any new medications (BP?): no  Precipitating factors:   Worse with activity: YES  Worse with head movement: YES  Alleviating factors:   Does staying in a fixed position give relief: no   Therapies tried and outcome: None  Dizziness: for about 2 months has had the sensation of dizziness,primarily happens when she turns her head suddenly and when getting out of bed in the morning.  At times feels that the room is spinning.  Has also had chronic left ear fullness.    Headache:  Headaches are located behind the left eye, usually occur around the time of her menses.  Headaches are characterized by being unilateral (left eye), stabbing in nature at times with vision disturbance.  Rates pain 7/10. Denies accompanying numbness, tingling, nausea, vomiting.  Usually takes ibuprofen 600 mg which decreases the pain.  Denies previous history of migraine, mother has migraines that started around menopause.  Cervical cancer screening;  Last pap was completed at [facility] in 7/2020, normal results.   Thyroid nodule:  Initially seen by endocrinology  in  9/2019, repeat US in 3/2020 without any change in thyroid nodule.  Is due for an US in 3/2021.  Ernestine Cardozo will no longer be at  [facility] Citlali would like a referral to another endocrinologist.   Hemorrhagic Ovarian Cyst:  Follow up US completed by [facility], patient reports the cyst has almost resolved.      Future Appointments   Date Time Provider Department Center   11/6/2020  1:30 PM Haase, Susan Rachele, APRN CNP [facility]     Appointment Notes for this encounter:   Experiencing dizziness, fogginess some vision issues, rescheduled from today - due to provider being late to room    Health Maintenance Due   Topic Date Due     ANNUAL REVIEW OF HM ORDERS  1975     HIV SCREENING  05/28/1989     HEPATITIS C SCREENING  05/28/1992     PAP  04/28/2020     PREVENTIVE CARE VISIT  08/30/2020       Objective    LMP 10/22/2020   Body mass index is 25.06 kg/m .   BP (P) 106/71 (BP Location: Right arm, Patient Position: Supine, Cuff Size: Adult Regular)   Pulse (P) 68   Temp 98.6  F (37  C) (Oral)   Wt 66.2 kg (146 lb)   LMP 10/22/2020   SpO2 99%   BMI 25.06 kg/m    Physical Exam   GENERAL: healthy, alert and no distress  HENT: ear canals and TM's normal, nose and mouth without ulcers or lesions  NECK: no adenopathy, no asymmetry, masses, or scars and thyroid normal to palpation  RESP: lungs clear to auscultation - no rales, rhonchi or wheezes  CV: regular rate and rhythm, normal S1 S2, no S3 or S4, no murmur  MENTAL STATUS:  Alert, oriented x3.  Speech fluent with normal naming. CRANIAL NERVES:  Pupils are equal, round, reactive to light.  Extraocular movements full.  Visual fields full.  Facial  movement normal.  Sternocleidomastoid and trapezius strength intact.  Neck strength was normal.    NEUROLOGIC:  Motor 5/5.  .  Good finger-nose-finger, romberg negative.  Normal heel to toe gait without ataxia, Thomas pike negative to right, slight positive to left.    PSYCH: mentation appears normal, affect normal/bright          Citlali was seen today for dizziness.    Diagnoses and all orders for this visit:    Dizziness:  Unclear etiology, hallpike positive  to the left which would suggest vertigo,  reports room spinning.  Also reports dizziness with standing. Orthostatic hypotension present:   BP supine 119/77 with HR 67, BP standing 107/76 with HR 85.  Discussed increasing daily water intake to at least 64 oz per day.  Will also check CBC for anemia and CMP for electrolyte imbalance.    -     CBC with platelets differential  -     Comprehensive metabolic panel  -     MR Brain w/o & w Contrast; Future    Acute nonintractable headache, unspecified headache type:  Headaches are characteristic of Migraine Headaches, will obtain an MRI since headache is new, increased in frequency.  Discussed taking ibuprofen 600 mg at onset of headache, may also take Excedrin migraine (per package recommendations).   -     MR Brain w/o & w Contrast; Future    Thyroid nodule:  Will check thyroid levels as well as obtain thyroid US in 2/2021 (order placed).  Referral placed to follow up with [facility] after US is completed.   -     ENDOCRINOLOGY ADULT REFERRAL  -     TSH  -     Thyroid peroxidase antibody  -     Anti thyroglobulin antibody  -     US Thyroid; Future    Claustrophobia: take xanax prior to MRI, is aware that she can NOT drive after taking, will need to have transportation to and from MRI site.    Prefers to have MRI done in an open scanner.  -     ALPRAZolam (XANAX) 0.5 MG tablet; Take 1 mg (2 tabs) prior to test, may repeat once if needed. Do NOT drive after taking    Other orders  -     REVIEW OF HEALTH MAINTENANCE PROTOCOL ORDERS      Follow up in 4 weeks for recheck of dizziness and headache, sooner if symptoms worsen.

## 2020-11-07 NOTE — PATIENT INSTRUCTIONS
Patient Education     Dizziness (Uncertain Cause)  Dizziness is a common symptom. It may be described as lightheadedness, spinning, or feeling like you are going to faint. Dizziness can have many causes.  Be sure to tell the healthcare provider about:    All medicines you take, including prescription, over-the-counter, herbs, and supplements    Any other symptoms you have    Any health problems you are being treated for    Any past major health problems you've had, such as a heart attack, balance issues, hearing problems, or blood pressure problems    Anything that causes the dizziness to get worse or better  Today's exam did not show an exact cause for your dizziness. Other tests may be needed. Follow up with your healthcare provider.  Home care    Dizziness that occurs with sudden standing may be a sign of mild dehydration. Drink extra fluids for the next few days.    If you recently started a new medicine, stopped a medicine, or had the dose of a current medicine changed, talk with the prescribing healthcare provider. Your medicine plan may need adjustment.    If dizziness lasts more than a few seconds, sit or lie down until it passes. This may help prevent injury in case you pass out. Get up slowly when you feel better.    Don't drive or use power tools or dangerous equipment until you have had no dizziness for at least 48 hours.  Follow-up care  Follow up with your healthcare provider for further evaluation within the next 7 days or as advised.  When to seek medical advice  Call your healthcare provider for any of the following:    Worsening of symptoms or new symptoms    Passing out or seizure    Repeated vomiting    Headache    Palpitations (the sense that your heart is fluttering or beating fast or hard)    Shortness of breath    Blood in vomit or stool (black or red color)    Weakness of an arm or leg or 1 side of the face    Vision or hearing changes    Trouble walking or speaking    Chest, arm, neck,  back, or jaw pain  Evelyn last reviewed this educational content on 11/1/2017 2000-2020 [email or web address]. [address] [facility], [place] [address]. All rights reserved. This information is not intended as a substitute for professional medical care. Always follow your healthcare professional's instructions.

## 2020-11-07 NOTE — RESULT ENCOUNTER NOTE
Des Godwin,  Your lab results are as below:  1)  TSH (thyroid level) 2.06 which is normal (range 0.4-4)  2)  CBC (checks for anemia and infection) is normal.  3)  Glucose is normal at 99 (normal fasting is <100).    If you have any questions do not hesitate to call the clinic to discuss the results with me further.     Sincerely,    Susan Haase, CNP

## 2020-11-09 LAB
THYROGLOB AB SERPL IA-ACNC: <20 IU/ML (ref 0–40)
THYROPEROXIDASE AB SERPL-ACNC: 10 IU/ML

## 2020-11-10 NOTE — RESULT ENCOUNTER NOTE
Des Godwin,  Your thyroid tests are all normal. Can you please let me know the date your MRI is scheduled?  [facility].  If I know when your scan is being completed I will call the next day for the results.  Thanks,  Susan Haase, CNP

## 2020-11-13 ENCOUNTER — TRANSFERRED RECORDS (OUTPATIENT)
Dept: HEALTH INFORMATION MANAGEMENT | Facility: CLINIC | Age: 45
End: 2020-11-13

## 2020-11-16 ENCOUNTER — TELEPHONE (OUTPATIENT)
Dept: FAMILY MEDICINE | Facility: CLINIC | Age: 45
End: 2020-11-16

## 2020-11-16 NOTE — TELEPHONE ENCOUNTER
Called with MRI results, normal. Had a slight episode of dizziness on 11/14/2020, other than that symptoms have improved with increased fluids.  Migraine headaches usually has 3 per month related to her period. Will set up a video visit if symptoms of dizziness return, at that time will recommend possible zio patch monitor and PT.  Susan Haase, CNP

## 2020-12-08 ENCOUNTER — TRANSFERRED RECORDS (OUTPATIENT)
Dept: HEALTH INFORMATION MANAGEMENT | Facility: CLINIC | Age: 45
End: 2020-12-08

## 2021-01-04 ENCOUNTER — MYC MEDICAL ADVICE (OUTPATIENT)
Dept: FAMILY MEDICINE | Facility: CLINIC | Age: 46
End: 2021-01-04

## 2021-01-05 NOTE — TELEPHONE ENCOUNTER
Routed to Susan Haase,    Please see patient mychart message and advise, patient has appt scheduled for 1/21    David Temple RN

## 2021-01-10 ENCOUNTER — HEALTH MAINTENANCE LETTER (OUTPATIENT)
Age: 46
End: 2021-01-10

## 2021-01-21 ENCOUNTER — OFFICE VISIT (OUTPATIENT)
Dept: FAMILY MEDICINE | Facility: CLINIC | Age: 46
End: 2021-01-21
Payer: COMMERCIAL

## 2021-01-21 VITALS
SYSTOLIC BLOOD PRESSURE: 110 MMHG | BODY MASS INDEX: 26.16 KG/M2 | RESPIRATION RATE: 16 BRPM | HEART RATE: 80 BPM | DIASTOLIC BLOOD PRESSURE: 80 MMHG | WEIGHT: 152.4 LBS | OXYGEN SATURATION: 100 % | TEMPERATURE: 98 F

## 2021-01-21 DIAGNOSIS — R47.02 DYSPHASIA: Primary | ICD-10-CM

## 2021-01-21 DIAGNOSIS — E04.1 THYROID NODULE: ICD-10-CM

## 2021-01-21 PROCEDURE — 99213 OFFICE O/P EST LOW 20 MIN: CPT | Performed by: NURSE PRACTITIONER

## 2021-01-21 NOTE — PROGRESS NOTES
"  Assessment & Plan     Dysphasia: unclear etiology, stricture vs hiatal hernia vs GERD, will refer to [facility] for further evaluation.   - GASTROENTEROLOGY ADULT REF CONSULT ONLY; Future    Thyroid nodule: sub centimeter lesion.  0956}     BMI:   Estimated body mass index is 26.16 kg/m  as calculated from the following:    Height as of 11/5/20: 1.626 m (5' 4\").    Weight as of this encounter: 69.1 kg (152 lb 6.4 oz).   Follow up in the next 6 months for physical, sooner as needed.     Susan Haase, APRN CNP  [facility] GAUDENCIO Godwin is a 45 year old who presents to clinic today for the following health issues     HPI       Chest Pain  Onset/Duration: X3 months  Description:   Location:  middle   Character: Dull throb when drinking thicker liquids (breakfast shake)   Radiation: None  Duration: Just last until whatever is there clears   Intensity: mild  Progression of Symptoms: same and constant  Accompanying Signs & Symptoms:  Shortness of breath: no  Sweating: no  Nausea/vomiting: YES- Nausea after she eats  Lightheadedness: no  Palpitations: YES- Not associated with this  Fever/Chills: no  Cough: no           Heartburn: No  History:   Family history of heart disease: YES  Tobacco use: no  Previous similar symptoms: no   Precipitating factors:   Worse with exertion: no  Worse with deep breaths: no           Related to eating: YES           Better with burping: no  Denies cough when eating.    Can feel thick fluid and food and is goes down the esophagus and into the stomach, she feels that everything gets stuck prior to going into the stomach.    Issue increases when eating larger meals, denies any specific type of food that causes this issue.    Denies heartburn, nausea, vomiting, change in stools, blood in stools, weight loss, decrease in appetite.      Thyroid nodule:  US completed in 12/2020, nodule without change in size, sub centimeter.     Review of Systems   CONSTITUTIONAL: " NEGATIVE for fever, chills, change in weight  ENT/MOUTH: NEGATIVE for ear, mouth and throat problems  RESP: NEGATIVE for significant cough or SOB  CV: NEGATIVE for chest pain, palpitations or peripheral edema  GI: see HPI  PSYCHIATRIC: NEGATIVE for changes in mood or affect      Objective    /80 (BP Location: Right arm, Patient Position: Sitting, Cuff Size: Adult Regular)   Pulse 80   Temp 98  F (36.7  C) (Oral)   Resp 16   Wt 69.1 kg (152 lb 6.4 oz)   LMP 01/09/2021 (Exact Date)   SpO2 100%   Breastfeeding No   BMI 26.16 kg/m    Body mass index is 26.16 kg/m .  Physical Exam   GENERAL: healthy, alert and no distress  HENT: ear canals and TM's normal, nose and mouth without ulcers or lesions  NECK: no adenopathy, no asymmetry, masses, or scars and thyroid normal to palpation  RESP: lungs clear to auscultation - no rales, rhonchi or wheezes  CV: regular rate and rhythm, normal S1 S2,   ABDOMEN: soft, nontender, no hepatosplenomegaly, no masses and bowel sounds normal  PSYCH: mentation appears normal, affect normal/bright        Future Appointments   Date Time Provider Department Center   1/21/2021  9:50 AM Haase, Susan Rachele, APRN CNP [facility]     Appointment Notes for this encounter:   sh reviewed Pain in chest on left side when swallowing.    Health Maintenance Due   Topic Date Due     HIV SCREENING  05/28/1989     HEPATITIS C SCREENING  05/28/1992     PREVENTIVE CARE VISIT  08/30/2020     PHQ-2  01/01/2021   MyChart Status:  Active and Using

## 2021-01-27 ENCOUNTER — MYC MEDICAL ADVICE (OUTPATIENT)
Dept: FAMILY MEDICINE | Facility: CLINIC | Age: 46
End: 2021-01-27

## 2021-01-28 ENCOUNTER — TRANSFERRED RECORDS (OUTPATIENT)
Dept: HEALTH INFORMATION MANAGEMENT | Facility: CLINIC | Age: 46
End: 2021-01-28

## 2021-03-02 ENCOUNTER — TRANSFERRED RECORDS (OUTPATIENT)
Dept: HEALTH INFORMATION MANAGEMENT | Facility: CLINIC | Age: 46
End: 2021-03-02

## 2021-03-13 ENCOUNTER — HEALTH MAINTENANCE LETTER (OUTPATIENT)
Age: 46
End: 2021-03-13

## 2021-03-27 ENCOUNTER — MYC MEDICAL ADVICE (OUTPATIENT)
Dept: FAMILY MEDICINE | Facility: CLINIC | Age: 46
End: 2021-03-27

## 2021-03-30 NOTE — TELEPHONE ENCOUNTER
Spoke with Citlali, she is on vacation and would like a return call on April 8th or 9th.    Susan Haase, CNP

## 2021-04-08 NOTE — TELEPHONE ENCOUNTER
Spoke with Citlali about the pain that she has been having in her abdomen. Was prescribed prilosec by MARVIN which caused diarrhea, was scheduled for an endoscopy which she cancelled due to cost.  Pain is primarily under the ribs in the left upper quadranat, at times has pain under the ribs.  Continues to feel that she has an obstruction with swallowing.  Try TUMS prior to eating. Will be having colonoscopy and endoscopy the end of April.  Susan Haase, CNP.

## 2021-05-21 ENCOUNTER — MYC MEDICAL ADVICE (OUTPATIENT)
Dept: FAMILY MEDICINE | Facility: CLINIC | Age: 46
End: 2021-05-21

## 2021-05-21 DIAGNOSIS — R10.13 EPIGASTRIC PAIN: Primary | ICD-10-CM

## 2021-06-01 ENCOUNTER — ANCILLARY PROCEDURE (OUTPATIENT)
Dept: GENERAL RADIOLOGY | Facility: CLINIC | Age: 46
End: 2021-06-01
Attending: NURSE PRACTITIONER
Payer: COMMERCIAL

## 2021-06-01 DIAGNOSIS — R10.13 EPIGASTRIC PAIN: ICD-10-CM

## 2021-06-01 PROCEDURE — 71045 X-RAY EXAM CHEST 1 VIEW: CPT | Performed by: RADIOLOGY

## 2021-06-02 NOTE — RESULT ENCOUNTER NOTE
Des Godwin,  The xray of your chest is normal, no evidence of a hiatal hernia was noted.    Sincerely,     Susan Haase, CNP

## 2021-06-21 NOTE — PROGRESS NOTES
Duplicate note.   Rifampin Pregnancy And Lactation Text: This medication is Pregnancy Category C and it isn't know if it is safe during pregnancy. It is also excreted in breast milk and should not be used if you are breast feeding.

## 2021-10-23 ENCOUNTER — HEALTH MAINTENANCE LETTER (OUTPATIENT)
Age: 46
End: 2021-10-23

## 2021-11-03 ENCOUNTER — TRANSFERRED RECORDS (OUTPATIENT)
Dept: HEALTH INFORMATION MANAGEMENT | Facility: CLINIC | Age: 46
End: 2021-11-03
Payer: COMMERCIAL

## 2021-11-29 ENCOUNTER — HOSPITAL ENCOUNTER (EMERGENCY)
Facility: CLINIC | Age: 46
Discharge: HOME | End: 2021-11-29
Attending: EMERGENCY MEDICINE | Admitting: EMERGENCY MEDICINE
Payer: COMMERCIAL

## 2021-11-29 VITALS
DIASTOLIC BLOOD PRESSURE: 98 MMHG | TEMPERATURE: 98.7 F | BODY MASS INDEX: 24.75 KG/M2 | WEIGHT: 145 LBS | OXYGEN SATURATION: 98 % | SYSTOLIC BLOOD PRESSURE: 134 MMHG | HEART RATE: 103 BPM | HEIGHT: 64 IN | RESPIRATION RATE: 18 BRPM

## 2021-11-29 DIAGNOSIS — R04.0 EPISTAXIS: ICD-10-CM

## 2021-11-29 PROCEDURE — 30903 CONTROL OF NOSEBLEED: CPT | Mod: LT

## 2021-11-29 PROCEDURE — 99284 EMERGENCY DEPT VISIT MOD MDM: CPT | Mod: 25

## 2021-11-29 RX ORDER — OXYMETAZOLINE HYDROCHLORIDE 0.05 G/100ML
2 SPRAY NASAL ONCE
Status: DISCONTINUED | OUTPATIENT
Start: 2021-11-29 | End: 2021-11-29 | Stop reason: HOSPADM

## 2021-11-29 RX ORDER — LIDOCAINE HYDROCHLORIDE 20 MG/ML
1 JELLY TOPICAL ONCE
Status: DISCONTINUED | OUTPATIENT
Start: 2021-11-29 | End: 2021-11-29 | Stop reason: HOSPADM

## 2021-11-29 ASSESSMENT — MIFFLIN-ST. JEOR: SCORE: 1277.72

## 2021-11-29 NOTE — ED TRIAGE NOTES
Pt presents for evaluation of a nosebleed. Has had 2 today. First one lasted about 1.5 hours. Current episode started around 1455. Coming from both nostrils. Large amounts of blood and clots. Feels shaky. Not on any thinners. Currently also had cold symptoms. Had a negative COVID test on Saturday. Nasal clamp place in triage.

## 2021-11-30 ASSESSMENT — ENCOUNTER SYMPTOMS
CHILLS: 0
SHORTNESS OF BREATH: 0
FEVER: 0
RHINORRHEA: 1
COUGH: 1
DIZZINESS: 1

## 2021-11-30 NOTE — ED PROVIDER NOTES
"  History     Chief Complaint:  Epistaxis       HPI   Citlali Adrian is a 46 year old female who presents with chief complaint of nosebleed.  She notes she developed a cold several days ago.  She received a COVID-19 test yesterday that was negative.  Today, she has had 2 heavy episodes of nosebleeding.  She questions whether the Covid swab in her left nostril could have started her bleeding.  She notes bleeding was significant and was coming out of both nostrils.  A nasal clamp was applied in triage and terminated bleeding.  Patient notes history of nosebleeds in the past, but never this bad.  She denies loss of consciousness, but does note some dizziness.  She has no other complaints today.    Allergies:  Hydrogen Peroxide  No Known Allergies  Other [No Clinical Screening - See Comments]     Medications:    clobetasol propionate 0.05 % LIQD      Past Medical History:    Past Medical History:   Diagnosis Date     Microscopic Hematuria      Psoriasis      Past Surgical History:    Past Surgical History:   Procedure Laterality Date     D & C        Family History:    Noncontributory    Social History:   reports that she has never smoked. She has never used smokeless tobacco. She reports current alcohol use. She reports that she does not use drugs.    PCP: Haase, Susan Rachele     Review of Systems   Constitutional: Negative for chills and fever.   HENT: Positive for congestion, nosebleeds and rhinorrhea.    Respiratory: Positive for cough. Negative for shortness of breath.    Cardiovascular: Negative for chest pain.   Neurological: Positive for dizziness. Negative for syncope.   All other systems reviewed and are negative.         Physical Exam     Patient Vitals for the past 24 hrs:   BP Temp Temp src Pulse Resp SpO2 Height Weight   11/29/21 1558 (!) 134/98 98.7  F (37.1  C) Oral 103 18 98 % 1.626 m (5' 4\") 65.8 kg (145 lb)        Physical Exam    Head:  The scalp, face, and head appear normal  Eyes:  Conjunctivae are " normal  ENT:    The nose is normal.  No active epistaxis. Evidence of recent bleeding in left nare about 2 cm on nasal septum.  Right nare normal.    Pinnae are normal  Neck:  Trachea midline  CV:  Normal rate.  Resp:  No respiratory distress.  Speaking in complete sentences.  Skin:  No rash or lesions noted  Neuro: Speech is normal and fluent. Face is symmetric. Moving all extremities well.   Psych:  Awake. Alert.  Normal affect.  Appropriate interactions.            Emergency Department Course      Procedure Note:  Epistaxis Management    Informed verbal consent obtained.  Left nare was anesthetized with 2% lidocaine jelly topically.   Silver nitrate was applied to anterior bleeding area located on septal area.  Bleeding well controlled and watched patient for 15 minutes after with no return in bleeding.   Patient tolerated procedure well, no complications.      Interventions:  Afrin, 1 spray each nare  Topical 2% lidocaine      Impression & Plan       Medical Decision Making:  Pt presents for evaluation of nasal bleeding and epistaxis.  The bleeding was controlled with interventions in the ED and therefore supportive outpatient management is indicated.  Close follow-up with ENT and primary care; see discharge instructions.  Given that bleeding terminated with nasal clamp out in triage, I do not believe any work-up is indicated for coagulopathy at this time, especially as likely source was nasal swab yesterday.  Patient vital signs are also stable, therefore I do not believe hemoglobin is indicated as well.    Silver nitrate was used to cauterize the bleeding source, see above procedure note.  The bleeding stopped and did not restart here in ED, therefore no nasal packing is indicated.  Discussed with patient to use humidity and vaseline or bacitracin in nares bid for the next week.  No nose blowing for the next 3 days.  Afrin for 3 days.  Plan follow-up with ENT if any persistent bleeding. She is in stable  condition at the time of discharge, indications for return to the ED were discussed as well as follow up. All questions were answered and she is in agreement with the plan.      Diagnosis:    ICD-10-CM    1. Epistaxis  R04.0         Discharge Medications:  Oxymetazoline     Vasile Tafoya MD  11/30/21 0436

## 2021-12-01 ENCOUNTER — MYC MEDICAL ADVICE (OUTPATIENT)
Dept: FAMILY MEDICINE | Facility: CLINIC | Age: 46
End: 2021-12-01
Payer: COMMERCIAL

## 2021-12-01 NOTE — TELEPHONE ENCOUNTER
Misty- patient last saw you 1/21/21.  Needs visit.  Requests message be reviewed by you so routing.  Ly Todd RN

## 2021-12-01 NOTE — TELEPHONE ENCOUNTER
Please call Citlali and let her know that I reviewed her message and that I can work her in for a video visit tomorrow at 11:45.    Thanks,  Susan Haase, CNP

## 2021-12-03 ENCOUNTER — E-VISIT (OUTPATIENT)
Dept: FAMILY MEDICINE | Facility: CLINIC | Age: 46
End: 2021-12-03
Payer: COMMERCIAL

## 2021-12-03 DIAGNOSIS — B37.31 VAGINAL YEAST INFECTION: ICD-10-CM

## 2021-12-03 DIAGNOSIS — J40 BRONCHITIS: Primary | ICD-10-CM

## 2021-12-03 PROCEDURE — 99421 OL DIG E/M SVC 5-10 MIN: CPT | Performed by: NURSE PRACTITIONER

## 2021-12-03 RX ORDER — AZITHROMYCIN 250 MG/1
TABLET, FILM COATED ORAL
Qty: 6 TABLET | Refills: 0 | Status: SHIPPED | OUTPATIENT
Start: 2021-12-03 | End: 2023-04-21

## 2021-12-03 RX ORDER — FLUCONAZOLE 150 MG/1
150 TABLET ORAL ONCE
Qty: 1 TABLET | Refills: 0 | Status: SHIPPED | OUTPATIENT
Start: 2021-12-03 | End: 2021-12-03

## 2021-12-03 NOTE — PATIENT INSTRUCTIONS

## 2021-12-07 ENCOUNTER — MYC MEDICAL ADVICE (OUTPATIENT)
Dept: FAMILY MEDICINE | Facility: CLINIC | Age: 46
End: 2021-12-07

## 2021-12-07 ENCOUNTER — OFFICE VISIT (OUTPATIENT)
Dept: FAMILY MEDICINE | Facility: CLINIC | Age: 46
End: 2021-12-07
Payer: COMMERCIAL

## 2021-12-07 VITALS
OXYGEN SATURATION: 99 % | WEIGHT: 153 LBS | DIASTOLIC BLOOD PRESSURE: 80 MMHG | SYSTOLIC BLOOD PRESSURE: 110 MMHG | TEMPERATURE: 97.4 F | HEART RATE: 73 BPM | BODY MASS INDEX: 26.26 KG/M2

## 2021-12-07 DIAGNOSIS — E04.1 THYROID NODULE: Primary | ICD-10-CM

## 2021-12-07 DIAGNOSIS — L40.8 OTHER PSORIASIS: ICD-10-CM

## 2021-12-07 DIAGNOSIS — Z13.6 CARDIOVASCULAR SCREENING; LDL GOAL LESS THAN 160: ICD-10-CM

## 2021-12-07 DIAGNOSIS — B37.81 CANDIDIASIS OF ESOPHAGUS (H): Primary | ICD-10-CM

## 2021-12-07 PROBLEM — K44.9 DIAPHRAGMATIC HERNIA: Status: ACTIVE | Noted: 2021-11-03

## 2021-12-07 PROCEDURE — 99213 OFFICE O/P EST LOW 20 MIN: CPT | Performed by: NURSE PRACTITIONER

## 2021-12-07 NOTE — PROGRESS NOTES
Assessment & Plan     Candidiasis of esophagus (H): treated with diflucan, will obtain below labs to further evaluate for cutaneous lymphoma.  - CBC with Platelets & Differential  - Comprehensive metabolic panel  - ESR: Erythrocyte sedimentation rate  - CRP, inflammation    Other psoriasis: has an appt with dermatology in 1/2022.     - CBC with Platelets & Differential  - Comprehensive metabolic panel  - ESR: Erythrocyte sedimentation rate  - CRP, inflammation    CARDIOVASCULAR SCREENING; LDL GOAL LESS THAN 160    - Lipid panel reflex to direct LDL Fasting    Follow up in 1 year, sooner as needed.    Susan Haase, APRN CNP  [facility] PRESTON Godwin is a 46 year old who presents for the following health issues     HPI    Concern - endoscopy follow up  Onset: 11/3   Description: pt states she had an endoscopy done and was diagnosed with candida esophagitis and was told to get some blood work done, states that they also found a hiatal hernia     Is concerned about possible lymphoma due to having skin lesions and candida esophagitis.  Denies sweating, fever, pruritis.      Review of Systems   CONSTITUTIONAL: NEGATIVE for fever, chills, change in weight  ENT/MOUTH: NEGATIVE for ear, mouth and throat problems  RESP: NEGATIVE for significant cough or SOB  CV: NEGATIVE for chest pain, palpitations or peripheral edema  Skin:  See HPI  PSYCHIATRIC: NEGATIVE for changes in mood or affect      Objective    /80 (BP Location: Right arm, Patient Position: Sitting, Cuff Size: Adult Regular)   Pulse 73   Temp 97.4  F (36.3  C) (Oral)   Wt 69.4 kg (153 lb)   SpO2 99%   BMI 26.26 kg/m    Body mass index is 26.26 kg/m .  Physical Exam   GENERAL: healthy, alert and no distress  HENT: ear canals and TM's normal, nose and mouth without ulcers or lesions  NECK: no adenopathy, no asymmetry, masses, or scars and thyroid normal to palpation  RESP: lungs clear to auscultation - no rales, rhonchi or  wheezes  CV: regular rate and rhythm, normal S1 S2  PSYCH: mentation appears normal, affect normal/bright  LYMPH: no cervical, supraclavicular, axillary, or inguinal adenopathy

## 2021-12-08 ENCOUNTER — LAB (OUTPATIENT)
Dept: LAB | Facility: CLINIC | Age: 46
End: 2021-12-08
Payer: COMMERCIAL

## 2021-12-08 DIAGNOSIS — Z13.6 CARDIOVASCULAR SCREENING; LDL GOAL LESS THAN 160: ICD-10-CM

## 2021-12-08 DIAGNOSIS — B37.81 CANDIDIASIS OF ESOPHAGUS (H): ICD-10-CM

## 2021-12-08 DIAGNOSIS — L40.8 OTHER PSORIASIS: ICD-10-CM

## 2021-12-08 LAB
BASOPHILS # BLD AUTO: 0.1 10E3/UL (ref 0–0.2)
BASOPHILS NFR BLD AUTO: 1 %
CRP SERPL-MCNC: 4.3 MG/L (ref 0–8)
EOSINOPHIL # BLD AUTO: 0.2 10E3/UL (ref 0–0.7)
EOSINOPHIL NFR BLD AUTO: 3 %
ERYTHROCYTE [DISTWIDTH] IN BLOOD BY AUTOMATED COUNT: 11.9 % (ref 10–15)
ERYTHROCYTE [SEDIMENTATION RATE] IN BLOOD BY WESTERGREN METHOD: 11 MM/HR (ref 0–20)
HCT VFR BLD AUTO: 36.7 % (ref 35–47)
HGB BLD-MCNC: 12.1 G/DL (ref 11.7–15.7)
IMM GRANULOCYTES # BLD: 0.1 10E3/UL
IMM GRANULOCYTES NFR BLD: 1 %
LYMPHOCYTES # BLD AUTO: 1.5 10E3/UL (ref 0.8–5.3)
LYMPHOCYTES NFR BLD AUTO: 21 %
MCH RBC QN AUTO: 29.5 PG (ref 26.5–33)
MCHC RBC AUTO-ENTMCNC: 33 G/DL (ref 31.5–36.5)
MCV RBC AUTO: 90 FL (ref 78–100)
MONOCYTES # BLD AUTO: 0.6 10E3/UL (ref 0–1.3)
MONOCYTES NFR BLD AUTO: 8 %
NEUTROPHILS # BLD AUTO: 4.7 10E3/UL (ref 1.6–8.3)
NEUTROPHILS NFR BLD AUTO: 66 %
NRBC # BLD AUTO: 0 10E3/UL
NRBC BLD AUTO-RTO: 0 /100
PLATELET # BLD AUTO: 379 10E3/UL (ref 150–450)
RBC # BLD AUTO: 4.1 10E6/UL (ref 3.8–5.2)
WBC # BLD AUTO: 7.2 10E3/UL (ref 4–11)

## 2021-12-08 PROCEDURE — 85652 RBC SED RATE AUTOMATED: CPT

## 2021-12-08 PROCEDURE — 86140 C-REACTIVE PROTEIN: CPT

## 2021-12-08 PROCEDURE — 80053 COMPREHEN METABOLIC PANEL: CPT

## 2021-12-08 PROCEDURE — 85025 COMPLETE CBC W/AUTO DIFF WBC: CPT

## 2021-12-08 PROCEDURE — 80061 LIPID PANEL: CPT

## 2021-12-08 PROCEDURE — 36415 COLL VENOUS BLD VENIPUNCTURE: CPT

## 2021-12-08 NOTE — TELEPHONE ENCOUNTER
Misty- see mychart message below.  Do you want her to see endo?  Please advise.  Ly Todd, CONCETTA Vasquezi,  Your left lobe thyroid nodule is stable, no increase in size noted.  This is certainly reassuring.   The increased doppler flow is likely due to inflammation.  The most common reason for inflammation is autoimmune thyroid disease which you could still have even though your blood tests did not show any elevated thyroid antibodies.  It's hard to know what the cause is at this point but it isn't anything serious or anything that needs treatment at this time.    I would recommend repeating the thyroid ultrasound in another 1-2 years.  Here's a copy of the results for your records.  Please let me know if you have any questions or concerns.  Ernestine Cardozo NP  Endocrinology   Written by ANN Gonsalves CNP on 3/11/2020  3:06 PM CDT  Seen by patient Citlali Adrian on 3/11/2020  8:36 PM

## 2021-12-09 LAB
ALBUMIN SERPL-MCNC: 3.4 G/DL (ref 3.4–5)
ALP SERPL-CCNC: 62 U/L (ref 40–150)
ALT SERPL W P-5'-P-CCNC: 16 U/L (ref 0–50)
ANION GAP SERPL CALCULATED.3IONS-SCNC: 4 MMOL/L (ref 3–14)
AST SERPL W P-5'-P-CCNC: 12 U/L (ref 0–45)
BILIRUB SERPL-MCNC: 0.2 MG/DL (ref 0.2–1.3)
BUN SERPL-MCNC: 18 MG/DL (ref 7–30)
CALCIUM SERPL-MCNC: 8.7 MG/DL (ref 8.5–10.1)
CHLORIDE BLD-SCNC: 106 MMOL/L (ref 94–109)
CO2 SERPL-SCNC: 29 MMOL/L (ref 20–32)
CREAT SERPL-MCNC: 0.7 MG/DL (ref 0.52–1.04)
GFR SERPL CREATININE-BSD FRML MDRD: >90 ML/MIN/1.73M2
GLUCOSE BLD-MCNC: 95 MG/DL (ref 70–99)
POTASSIUM BLD-SCNC: 4.2 MMOL/L (ref 3.4–5.3)
PROT SERPL-MCNC: 7.2 G/DL (ref 6.8–8.8)
SODIUM SERPL-SCNC: 139 MMOL/L (ref 133–144)

## 2021-12-09 NOTE — TELEPHONE ENCOUNTER
Please review [employer] message and advise.   Chanelle Rios, YAIRN, RN  [facility]

## 2021-12-09 NOTE — TELEPHONE ENCOUNTER
An order for an US has been placed, if abnormal or concerning results will place referral for Citlali to see a new endocrinologist (was seeing Ernestine Cardozo).  Susan Haase, CNP

## 2021-12-16 LAB
CHOLEST SERPL-MCNC: 128 MG/DL
FASTING STATUS PATIENT QL REPORTED: YES
HDLC SERPL-MCNC: 42 MG/DL
LDLC SERPL CALC-MCNC: 64 MG/DL
NONHDLC SERPL-MCNC: 86 MG/DL
TRIGL SERPL-MCNC: 111 MG/DL

## 2021-12-21 ENCOUNTER — HOSPITAL ENCOUNTER (OUTPATIENT)
Dept: ULTRASOUND IMAGING | Facility: CLINIC | Age: 46
Discharge: HOME | End: 2021-12-21
Attending: NURSE PRACTITIONER | Admitting: NURSE PRACTITIONER
Payer: COMMERCIAL

## 2021-12-21 DIAGNOSIS — E04.1 THYROID NODULE: ICD-10-CM

## 2021-12-21 PROCEDURE — 76536 US EXAM OF HEAD AND NECK: CPT

## 2022-01-10 ENCOUNTER — TRANSFERRED RECORDS (OUTPATIENT)
Dept: HEALTH INFORMATION MANAGEMENT | Facility: CLINIC | Age: 47
End: 2022-01-10
Payer: COMMERCIAL

## 2022-04-07 ENCOUNTER — TRANSFERRED RECORDS (OUTPATIENT)
Dept: HEALTH INFORMATION MANAGEMENT | Facility: CLINIC | Age: 47
End: 2022-04-07
Payer: COMMERCIAL

## 2022-04-09 ENCOUNTER — HEALTH MAINTENANCE LETTER (OUTPATIENT)
Age: 47
End: 2022-04-09

## 2022-05-10 ENCOUNTER — MYC MEDICAL ADVICE (OUTPATIENT)
Dept: FAMILY MEDICINE | Facility: CLINIC | Age: 47
End: 2022-05-10
Payer: COMMERCIAL

## 2022-05-18 ENCOUNTER — TRANSFERRED RECORDS (OUTPATIENT)
Dept: HEALTH INFORMATION MANAGEMENT | Facility: CLINIC | Age: 47
End: 2022-05-18

## 2022-06-01 ENCOUNTER — TRANSFERRED RECORDS (OUTPATIENT)
Dept: MULTI SPECIALTY CLINIC | Facility: CLINIC | Age: 47
End: 2022-06-01

## 2022-06-01 LAB — RETINOPATHY: NEGATIVE

## 2022-06-09 ENCOUNTER — TRANSFERRED RECORDS (OUTPATIENT)
Dept: HEALTH INFORMATION MANAGEMENT | Facility: CLINIC | Age: 47
End: 2022-06-09
Payer: COMMERCIAL

## 2022-10-10 ENCOUNTER — HEALTH MAINTENANCE LETTER (OUTPATIENT)
Age: 47
End: 2022-10-10

## 2023-04-21 ENCOUNTER — MYC MEDICAL ADVICE (OUTPATIENT)
Dept: FAMILY MEDICINE | Facility: CLINIC | Age: 48
End: 2023-04-21

## 2023-04-21 ENCOUNTER — OFFICE VISIT (OUTPATIENT)
Dept: FAMILY MEDICINE | Facility: CLINIC | Age: 48
End: 2023-04-21
Payer: COMMERCIAL

## 2023-04-21 VITALS
TEMPERATURE: 98.4 F | OXYGEN SATURATION: 100 % | HEART RATE: 71 BPM | WEIGHT: 147 LBS | HEIGHT: 64 IN | DIASTOLIC BLOOD PRESSURE: 62 MMHG | BODY MASS INDEX: 25.1 KG/M2 | SYSTOLIC BLOOD PRESSURE: 94 MMHG | RESPIRATION RATE: 12 BRPM

## 2023-04-21 DIAGNOSIS — Z00.00 ROUTINE GENERAL MEDICAL EXAMINATION AT A HEALTH CARE FACILITY: Primary | ICD-10-CM

## 2023-04-21 DIAGNOSIS — E04.1 THYROID NODULE: ICD-10-CM

## 2023-04-21 LAB
ALBUMIN SERPL BCG-MCNC: 4.7 G/DL (ref 3.5–5.2)
ALP SERPL-CCNC: 58 U/L (ref 35–104)
ALT SERPL W P-5'-P-CCNC: 11 U/L (ref 10–35)
ANION GAP SERPL CALCULATED.3IONS-SCNC: 15 MMOL/L (ref 7–15)
AST SERPL W P-5'-P-CCNC: 19 U/L (ref 10–35)
BILIRUB SERPL-MCNC: 0.4 MG/DL
BUN SERPL-MCNC: 16 MG/DL (ref 6–20)
CALCIUM SERPL-MCNC: 9.7 MG/DL (ref 8.6–10)
CHLORIDE SERPL-SCNC: 105 MMOL/L (ref 98–107)
CHOLEST SERPL-MCNC: 136 MG/DL
CREAT SERPL-MCNC: 0.75 MG/DL (ref 0.51–0.95)
DEPRECATED HCO3 PLAS-SCNC: 21 MMOL/L (ref 22–29)
ERYTHROCYTE [DISTWIDTH] IN BLOOD BY AUTOMATED COUNT: 12.6 % (ref 10–15)
GFR SERPL CREATININE-BSD FRML MDRD: >90 ML/MIN/1.73M2
GLUCOSE SERPL-MCNC: 95 MG/DL (ref 70–99)
HBA1C MFR BLD: 5.3 % (ref 0–5.6)
HCT VFR BLD AUTO: 36.9 % (ref 35–47)
HDLC SERPL-MCNC: 50 MG/DL
HGB BLD-MCNC: 12.4 G/DL (ref 11.7–15.7)
LDLC SERPL CALC-MCNC: 70 MG/DL
MCH RBC QN AUTO: 29.5 PG (ref 26.5–33)
MCHC RBC AUTO-ENTMCNC: 33.6 G/DL (ref 31.5–36.5)
MCV RBC AUTO: 88 FL (ref 78–100)
NONHDLC SERPL-MCNC: 86 MG/DL
PLATELET # BLD AUTO: 346 10E3/UL (ref 150–450)
POTASSIUM SERPL-SCNC: 4.3 MMOL/L (ref 3.4–5.3)
PROT SERPL-MCNC: 7.4 G/DL (ref 6.4–8.3)
RBC # BLD AUTO: 4.21 10E6/UL (ref 3.8–5.2)
SODIUM SERPL-SCNC: 141 MMOL/L (ref 136–145)
TRIGL SERPL-MCNC: 78 MG/DL
TSH SERPL DL<=0.005 MIU/L-ACNC: 2.79 UIU/ML (ref 0.3–4.2)
WBC # BLD AUTO: 5.9 10E3/UL (ref 4–11)

## 2023-04-21 PROCEDURE — 85027 COMPLETE CBC AUTOMATED: CPT | Performed by: PHYSICIAN ASSISTANT

## 2023-04-21 PROCEDURE — 99396 PREV VISIT EST AGE 40-64: CPT | Mod: 25 | Performed by: PHYSICIAN ASSISTANT

## 2023-04-21 PROCEDURE — 36415 COLL VENOUS BLD VENIPUNCTURE: CPT | Performed by: PHYSICIAN ASSISTANT

## 2023-04-21 PROCEDURE — 84443 ASSAY THYROID STIM HORMONE: CPT | Performed by: PHYSICIAN ASSISTANT

## 2023-04-21 PROCEDURE — 90471 IMMUNIZATION ADMIN: CPT | Performed by: PHYSICIAN ASSISTANT

## 2023-04-21 PROCEDURE — 80053 COMPREHEN METABOLIC PANEL: CPT | Performed by: PHYSICIAN ASSISTANT

## 2023-04-21 PROCEDURE — 99213 OFFICE O/P EST LOW 20 MIN: CPT | Mod: 25 | Performed by: PHYSICIAN ASSISTANT

## 2023-04-21 PROCEDURE — 80061 LIPID PANEL: CPT | Performed by: PHYSICIAN ASSISTANT

## 2023-04-21 PROCEDURE — 83036 HEMOGLOBIN GLYCOSYLATED A1C: CPT | Performed by: PHYSICIAN ASSISTANT

## 2023-04-21 PROCEDURE — 90746 HEPB VACCINE 3 DOSE ADULT IM: CPT | Performed by: PHYSICIAN ASSISTANT

## 2023-04-21 RX ORDER — IBUPROFEN 600 MG/1
TABLET, FILM COATED ORAL
COMMUNITY
Start: 2023-04-15

## 2023-04-21 RX ORDER — CLOBETASOL PROPIONATE 0.5 MG/G
OINTMENT TOPICAL
COMMUNITY
Start: 2023-04-15

## 2023-04-21 RX ORDER — APREMILAST 30 MG/1
TABLET, FILM COATED ORAL
COMMUNITY
Start: 2023-03-29

## 2023-04-21 SDOH — ECONOMIC STABILITY: FOOD INSECURITY: WITHIN THE PAST 12 MONTHS, YOU WORRIED THAT YOUR FOOD WOULD RUN OUT BEFORE YOU GOT MONEY TO BUY MORE.: NEVER TRUE

## 2023-04-21 SDOH — ECONOMIC STABILITY: FOOD INSECURITY: WITHIN THE PAST 12 MONTHS, THE FOOD YOU BOUGHT JUST DIDN'T LAST AND YOU DIDN'T HAVE MONEY TO GET MORE.: NEVER TRUE

## 2023-04-21 SDOH — HEALTH STABILITY: PHYSICAL HEALTH: ON AVERAGE, HOW MANY DAYS PER WEEK DO YOU ENGAGE IN MODERATE TO STRENUOUS EXERCISE (LIKE A BRISK WALK)?: 3 DAYS

## 2023-04-21 SDOH — ECONOMIC STABILITY: INCOME INSECURITY: IN THE LAST 12 MONTHS, WAS THERE A TIME WHEN YOU WERE NOT ABLE TO PAY THE MORTGAGE OR RENT ON TIME?: NO

## 2023-04-21 SDOH — HEALTH STABILITY: PHYSICAL HEALTH: ON AVERAGE, HOW MANY MINUTES DO YOU ENGAGE IN EXERCISE AT THIS LEVEL?: 50 MIN

## 2023-04-21 SDOH — ECONOMIC STABILITY: INCOME INSECURITY: HOW HARD IS IT FOR YOU TO PAY FOR THE VERY BASICS LIKE FOOD, HOUSING, MEDICAL CARE, AND HEATING?: SOMEWHAT HARD

## 2023-04-21 ASSESSMENT — ENCOUNTER SYMPTOMS
ARTHRALGIAS: 0
HEMATOCHEZIA: 0
PALPITATIONS: 0
NAUSEA: 0
CHILLS: 0
WEAKNESS: 0
HEARTBURN: 0
MYALGIAS: 0
DYSURIA: 0
SHORTNESS OF BREATH: 0
EYE PAIN: 0
CONSTIPATION: 0
PARESTHESIAS: 0
NERVOUS/ANXIOUS: 0
DIARRHEA: 0
ABDOMINAL PAIN: 0
HEADACHES: 0
SORE THROAT: 0
JOINT SWELLING: 0
COUGH: 0
FREQUENCY: 0
FEVER: 0
DIZZINESS: 0
HEMATURIA: 0

## 2023-04-21 ASSESSMENT — LIFESTYLE VARIABLES
HOW OFTEN DO YOU HAVE A DRINK CONTAINING ALCOHOL: 2-4 TIMES A MONTH
HOW MANY STANDARD DRINKS CONTAINING ALCOHOL DO YOU HAVE ON A TYPICAL DAY: 3 OR 4
HOW OFTEN DO YOU HAVE SIX OR MORE DRINKS ON ONE OCCASION: NEVER
SKIP TO QUESTIONS 9-10: 0
AUDIT-C TOTAL SCORE: 3

## 2023-04-21 ASSESSMENT — SOCIAL DETERMINANTS OF HEALTH (SDOH)
HOW OFTEN DO YOU GET TOGETHER WITH FRIENDS OR RELATIVES?: PATIENT DECLINED
HOW OFTEN DO YOU ATTEND CHURCH OR RELIGIOUS SERVICES?: 1 TO 4 TIMES PER YEAR
IN A TYPICAL WEEK, HOW MANY TIMES DO YOU TALK ON THE PHONE WITH FAMILY, FRIENDS, OR NEIGHBORS?: MORE THAN THREE TIMES A WEEK
DO YOU BELONG TO ANY CLUBS OR ORGANIZATIONS SUCH AS CHURCH GROUPS UNIONS, FRATERNAL OR ATHLETIC GROUPS, OR SCHOOL GROUPS?: NO

## 2023-04-21 NOTE — TELEPHONE ENCOUNTER
Kiki- see [employer] Return message below.  Please advise.  Ly Todd, CONCETTA Adrian  to P  Support [place] Team (Leela) (supporting Kiki Mariano, JERAMY)    4/21/23  1:48 PM  Des Swanson. In regard to the  photos I sent in the other message  .my arm is extremely painful not only to the rich but my whole arm is aching. If this is okay and. Petra g to be concerned about that s fine but I would like feedback given to Chica who took the blood. I knew as soon as she pulled out the needle something wasn t right as it was painful and I have never had a problem with blood draws.  Thanks.

## 2023-04-21 NOTE — Clinical Note
Please abstract the following data from this visit with this patient into the appropriate field in Epic:  Tests that can be patient reported without a hard copy:  Pap smear done on this date: 6/2022 (approximately), by this group: [facility], results were normal.

## 2023-04-24 NOTE — TELEPHONE ENCOUNTER
Kiki Mariano PA-C    Please review my chart message, questions regarding labs that you have not yet viewed or placed notes on   RN did advise patient you are not in office Monday's and will review upon return to clinic and give recommendations     Thank you,   Cheyanne Chaney, Registered Nurse  [facility]

## 2023-04-25 ENCOUNTER — MYC MEDICAL ADVICE (OUTPATIENT)
Dept: FAMILY MEDICINE | Facility: CLINIC | Age: 48
End: 2023-04-25
Payer: COMMERCIAL

## 2023-04-25 ENCOUNTER — TRANSFERRED RECORDS (OUTPATIENT)
Dept: HEALTH INFORMATION MANAGEMENT | Facility: CLINIC | Age: 48
End: 2023-04-25
Payer: COMMERCIAL

## 2023-04-25 NOTE — TELEPHONE ENCOUNTER
Call back.    It looks like the hematoma loosened and is absorbing, which is why it expanded. If increased pain, tenderness, swelling or warmth--then pt should go to UC

## 2023-04-25 NOTE — TELEPHONE ENCOUNTER
Routing to [facility] to review also as we see provider doing messages at this time.  Ly Todd RN

## 2023-04-25 NOTE — TELEPHONE ENCOUNTER
See my chart message     RN left message for patient to return call to triage nurse, photos of arm in my chart extensive bruising from blood draw, need to get more information to see if patient needs to be seen due to increase in swelling and ensure there is no infection     Cheyanne Chaney, Registered Nurse  [facility]

## 2023-04-25 NOTE — TELEPHONE ENCOUNTER
Sent [employer] message to call and message was also left on her V/M to call.  Ly Todd RN

## 2023-04-26 NOTE — TELEPHONE ENCOUNTER
Patient reports it got worse before it got better. Notes that after the picture was taken it bled further, got bigger and worse. RN notified patient of provider note and recommendation below.     Patient denies increase in pain, tenderness, swelling or warmth. RN advised to go to UC with worsening or symptoms noted above. Patient verbalized understanding and is agreeable to plan.     Patient is upset about lab draws. Reports she does not typically have issues and felt like feels like there was something weird and wrong. Patient notes blood was not returning and there was pain when needle was withdrawn. RN apologized for patients experience, offered number for patient relations.   -[email or web address] sent with patient relations number.     Karis CLEVELAND RN, BSN, PHN  [facility]  [phone]

## 2023-05-27 ENCOUNTER — HEALTH MAINTENANCE LETTER (OUTPATIENT)
Age: 48
End: 2023-05-27

## 2023-09-25 ENCOUNTER — PATIENT OUTREACH (OUTPATIENT)
Dept: CARE COORDINATION | Facility: CLINIC | Age: 48
End: 2023-09-25
Payer: COMMERCIAL

## 2023-10-02 ENCOUNTER — MYC MEDICAL ADVICE (OUTPATIENT)
Dept: FAMILY MEDICINE | Facility: CLINIC | Age: 48
End: 2023-10-02
Payer: COMMERCIAL

## 2023-10-02 DIAGNOSIS — E04.1 THYROID NODULE: ICD-10-CM

## 2023-10-02 DIAGNOSIS — R79.89 ELEVATED TSH: Primary | ICD-10-CM

## 2023-10-23 ENCOUNTER — PATIENT OUTREACH (OUTPATIENT)
Dept: CARE COORDINATION | Facility: CLINIC | Age: 48
End: 2023-10-23
Payer: COMMERCIAL

## 2023-11-01 ENCOUNTER — MYC MEDICAL ADVICE (OUTPATIENT)
Dept: FAMILY MEDICINE | Facility: CLINIC | Age: 48
End: 2023-11-01
Payer: COMMERCIAL

## 2023-11-03 ENCOUNTER — HOSPITAL ENCOUNTER (OUTPATIENT)
Facility: CLINIC | Age: 48
End: 2023-11-03
Attending: OBSTETRICS & GYNECOLOGY | Admitting: OBSTETRICS & GYNECOLOGY
Payer: COMMERCIAL

## 2023-11-03 ENCOUNTER — TRANSFERRED RECORDS (OUTPATIENT)
Dept: HEALTH INFORMATION MANAGEMENT | Facility: CLINIC | Age: 48
End: 2023-11-03
Payer: COMMERCIAL

## 2023-11-20 ENCOUNTER — TRANSFERRED RECORDS (OUTPATIENT)
Dept: HEALTH INFORMATION MANAGEMENT | Facility: CLINIC | Age: 48
End: 2023-11-20
Payer: COMMERCIAL

## 2023-11-29 ENCOUNTER — TRANSFERRED RECORDS (OUTPATIENT)
Dept: HEALTH INFORMATION MANAGEMENT | Facility: CLINIC | Age: 48
End: 2023-11-29
Payer: COMMERCIAL

## 2023-12-04 RX ORDER — ACETAMINOPHEN 325 MG/1
975 TABLET ORAL ONCE
Status: CANCELLED | OUTPATIENT
Start: 2023-12-04 | End: 2023-12-04

## 2023-12-07 ENCOUNTER — HOSPITAL ENCOUNTER (OUTPATIENT)
Facility: CLINIC | Age: 48
End: 2023-12-07
Attending: OBSTETRICS & GYNECOLOGY | Admitting: OBSTETRICS & GYNECOLOGY
Payer: COMMERCIAL

## 2023-12-26 ENCOUNTER — NURSE TRIAGE (OUTPATIENT)
Dept: FAMILY MEDICINE | Facility: CLINIC | Age: 48
End: 2023-12-26
Payer: COMMERCIAL

## 2023-12-26 NOTE — TELEPHONE ENCOUNTER
Called pt and relayed provider message below. Patient was given an opportunity to ask questions, verbalized understanding of plan, and is agreeable.    Kacy CLEVELAND RN

## 2023-12-26 NOTE — TELEPHONE ENCOUNTER
"Nurse Triage SBAR    Is this a 2nd Level Triage? YES, LICENSED PRACTITIONER REVIEW IS REQUIRED    Situation: cough for 1 week    Background: Was seen Urgency room October- pt given abx. 3w ago had COVID, mild cough. 2 days ago seen online urgency room- given prednisone and inhaler.     Assessment: Strong productive cough, has coughing spells, clear mucus. Gags when coughing. Nasal congestion. No fever or blood in mucus. Feels she has mild difficulty breathing due to chest congestion and cough. Denies wheezing, states it is more \"rattly\" and makes her cough.    Worse in morning and night, takes prednisone and cough medicine in morning and seems to help a little. Inhaler makes her feel jittery so she cannot take later in day.    Protocol Recommended Disposition:   Go To Office Now    Recommendation: Has urgency room apt at 3pm today. Should patient be seen sooner? Manage at home? Please advise.          Routed to provider    Does the patient meet one of the following criteria for ADS visit consideration? 16+ years old, with an [facility] PCP       Nellie NOLASCO RN on 12/26/2023 at 7:43 AM         Reason for Disposition   MILD difficulty breathing (e.g., minimal/no SOB at rest, SOB with walking, pulse <100) and still present when not coughing    Additional Information   Negative: Bluish (or gray) lips or face   Negative: SEVERE difficulty breathing (e.g., struggling for each breath, speaks in single words)   Negative: Rapid onset of cough and has hives   Negative: Coughing started suddenly after medicine, an allergic food or bee sting   Negative: Difficulty breathing after exposure to flames, smoke, or fumes   Negative: Sounds like a life-threatening emergency to the triager   Negative: Previous asthma attacks and this feels like asthma attack   Negative: Dry cough (non-productive; no sputum or minimal clear sputum) and within 14 days of COVID-19 Exposure   Negative: MODERATE difficulty breathing (e.g., speaks in phrases, " "SOB even at rest, pulse 100-120) and still present when not coughing   Negative: Chest pain present when not coughing   Negative: Passed out (i.e., fainted, collapsed and was not responding)   Negative: Patient sounds very sick or weak to the triager    Answer Assessment - Initial Assessment Questions  1. ONSET: \"When did the cough begin?\"       3 weeks but worse 1 week ago   2. SEVERITY: \"How bad is the cough today?\"       Coughing spells hard to get out of, bad pressure headache, gagging cough   3. SPUTUM: \"Describe the color of your sputum\" (none, dry cough; clear, white, yellow, green)      Sometimes productive. Spit out- clear  4. HEMOPTYSIS: \"Are you coughing up any blood?\" If so ask: \"How much?\" (flecks, streaks, tablespoons, etc.)      no  5. DIFFICULTY BREATHING: \"Are you having difficulty breathing?\" If Yes, ask: \"How bad is it?\" (e.g., mild, moderate, severe)     - MILD: No SOB at rest, mild SOB with walking, speaks normally in sentences, can lie down, no retractions, pulse < 100.     - MODERATE: SOB at rest, SOB with minimal exertion and prefers to sit, cannot lie down flat, speaks in phrases, mild retractions, audible wheezing, pulse 100-120.     - SEVERE: Very SOB at rest, speaks in single words, struggling to breathe, sitting hunched forward, retractions, pulse > 120       Chest congestion, mild diff   6. FEVER: \"Do you have a fever?\" If Yes, ask: \"What is your temperature, how was it measured, and when did it start?\"      no  7. CARDIAC HISTORY: \"Do you have any history of heart disease?\" (e.g., heart attack, congestive heart failure)       no  8. LUNG HISTORY: \"Do you have any history of lung disease?\"  (e.g., pulmonary embolus, asthma, emphysema)      no  9. PE RISK FACTORS: \"Do you have a history of blood clots?\" (or: recent major surgery, recent prolonged travel, bedridden)      no  10. OTHER SYMPTOMS: \"Do you have any other symptoms?\" (e.g., runny nose, wheezing, chest pain)        Congestion " "nasal, chest congestion,   11. PREGNANCY: \"Is there any chance you are pregnant?\" \"When was your last menstrual period?\"          12. TRAVEL: \"Have you traveled out of the country in the last month?\" (e.g., travel history, exposures)        no    Protocols used: Cough-A-OH    "

## 2023-12-26 NOTE — TELEPHONE ENCOUNTER
Needing office visit. Urgent care, pcp or extender. May be seen in the next 1-3 days. If fever of 102 develop, be seen immediately. If inability to speak in full sentences or severe chest pains develop, to go to ER.     Karl meza, pac

## 2024-02-01 ENCOUNTER — MYC MEDICAL ADVICE (OUTPATIENT)
Dept: FAMILY MEDICINE | Facility: CLINIC | Age: 49
End: 2024-02-01
Payer: COMMERCIAL

## 2024-02-05 ENCOUNTER — TELEPHONE (OUTPATIENT)
Dept: FAMILY MEDICINE | Facility: CLINIC | Age: 49
End: 2024-02-05
Payer: COMMERCIAL

## 2024-02-05 ENCOUNTER — MYC MEDICAL ADVICE (OUTPATIENT)
Dept: FAMILY MEDICINE | Facility: CLINIC | Age: 49
End: 2024-02-05
Payer: COMMERCIAL

## 2024-02-05 NOTE — TELEPHONE ENCOUNTER
Patient notified via my chart     Cheyanne Chaney, Registered Nurse  [facility]

## 2024-02-05 NOTE — TELEPHONE ENCOUNTER
Appears pt has appt 2/14 with travel clinic provider. Patient can get Rx for traveler's diarrhea at that visit.     Patient due for hep A, Hep B before she travels.   I also recommend COVID and influenza as well. She may also want to consider Pneumococcal 20 as well.

## 2024-02-05 NOTE — TELEPHONE ENCOUNTER
RN called and spoke with patient. Relayed provider message.     Patient does not want appointment, unsure if it is covered by insurance. RN educated that some sort of visit is needed to prescribe new medication, attempted to continue to relay message about vaccines. Patient hung up on RN before could relay message about vaccines. Unable to confirm or deny if patient still wants travel appointment because patient hung up.      message sent regarding the rest of the message.       Kiki, would patient be able to submit an evisit if she does not want travel appointment?     Nellie NOLASCO RN on 2/5/2024 at 8:27 AM

## 2024-02-05 NOTE — TELEPHONE ENCOUNTER
Patient is Traveling to [place] in March she is up to date on her shots and she is wondering if you could prescribe medication for Travelers Diarrhea. Let patient know if approved . Thank you  Key Hough/

## 2024-02-05 NOTE — TELEPHONE ENCOUNTER
RN attempted to speak with patient. Patient stated full name in . RN left message for patient; stating  Message was sent regarding request for medication and vaccines. If patient has problems accessing [employer], call back and ask to speak with a nurse.       Postponing to tomorrow, see if patient has read [employer] message.     Nellie NOLASCO RN on 2/5/2024 at 9:04 AM

## 2024-02-05 NOTE — TELEPHONE ENCOUNTER
Yes, pt can submit Evisit, but I will also I still recommend a nurse only appointment (no charge) for vaccine appointment.    Kiki Mariano PA-C

## 2024-02-05 NOTE — TELEPHONE ENCOUNTER
Fredi Boucher PAC    Patient has appt scheduled for travel (see my chart) asking for billing codes prior to visit   We are not able to give that prior to the visit correct? As we dont know what will be discussed or given at the visit?     Thank you   Cheyanne Chaney, Registered Nurse  [facility]

## 2024-02-05 NOTE — TELEPHONE ENCOUNTER
The code that we bill for the actual travel visit is either 60242 or [id number]. Key gave her this information on the phone this morning. Vaccines are billed separately. Hope this helps.    Fredi Boucher PA-C on 2/5/2024 at 11:29 AM

## 2024-02-06 NOTE — TELEPHONE ENCOUNTER
Patient read [facility] message. Closing this encounter.      Nabila Wang RN on 2/6/2024 at 10:47 AM

## 2024-02-07 NOTE — TELEPHONE ENCOUNTER
See my chart     Cheyanne Chaney, Registered Nurse, PAL (Patient Advocate Liaison)   [facility]   [phone]

## 2024-02-07 NOTE — TELEPHONE ENCOUNTER
Routed to bronze TC, see [facility] message, pt wants appointment with travel clinic, can you call?     Delmy Claire RN, BSN  [facility]

## 2024-02-15 ENCOUNTER — TRANSFERRED RECORDS (OUTPATIENT)
Dept: HEALTH INFORMATION MANAGEMENT | Facility: CLINIC | Age: 49
End: 2024-02-15
Payer: COMMERCIAL

## 2024-03-04 ENCOUNTER — MYC MEDICAL ADVICE (OUTPATIENT)
Dept: FAMILY MEDICINE | Facility: CLINIC | Age: 49
End: 2024-03-04
Payer: COMMERCIAL

## 2024-05-01 ENCOUNTER — OFFICE VISIT (OUTPATIENT)
Dept: FAMILY MEDICINE | Facility: CLINIC | Age: 49
End: 2024-05-01
Attending: PHYSICIAN ASSISTANT
Payer: COMMERCIAL

## 2024-05-01 VITALS
RESPIRATION RATE: 12 BRPM | HEART RATE: 77 BPM | HEIGHT: 64 IN | OXYGEN SATURATION: 97 % | TEMPERATURE: 98.8 F | DIASTOLIC BLOOD PRESSURE: 85 MMHG | WEIGHT: 144.8 LBS | SYSTOLIC BLOOD PRESSURE: 119 MMHG | BODY MASS INDEX: 24.72 KG/M2

## 2024-05-01 DIAGNOSIS — Z00.00 ROUTINE GENERAL MEDICAL EXAMINATION AT A HEALTH CARE FACILITY: Primary | ICD-10-CM

## 2024-05-01 LAB
ALBUMIN SERPL BCG-MCNC: 4.7 G/DL (ref 3.5–5.2)
ALP SERPL-CCNC: 55 U/L (ref 40–150)
ALT SERPL W P-5'-P-CCNC: 13 U/L (ref 0–50)
ANION GAP SERPL CALCULATED.3IONS-SCNC: 9 MMOL/L (ref 7–15)
AST SERPL W P-5'-P-CCNC: 20 U/L (ref 0–45)
BILIRUB SERPL-MCNC: 0.4 MG/DL
BUN SERPL-MCNC: 12.4 MG/DL (ref 6–20)
CALCIUM SERPL-MCNC: 9.4 MG/DL (ref 8.6–10)
CHLORIDE SERPL-SCNC: 104 MMOL/L (ref 98–107)
CHOLEST SERPL-MCNC: 145 MG/DL
CREAT SERPL-MCNC: 0.69 MG/DL (ref 0.51–0.95)
DEPRECATED HCO3 PLAS-SCNC: 24 MMOL/L (ref 22–29)
EGFRCR SERPLBLD CKD-EPI 2021: >90 ML/MIN/1.73M2
ERYTHROCYTE [DISTWIDTH] IN BLOOD BY AUTOMATED COUNT: 12.7 % (ref 10–15)
FASTING STATUS PATIENT QL REPORTED: NO
GLUCOSE SERPL-MCNC: 107 MG/DL (ref 70–99)
HBA1C MFR BLD: 5.4 % (ref 0–5.6)
HCT VFR BLD AUTO: 36.1 % (ref 35–47)
HDLC SERPL-MCNC: 59 MG/DL
HGB BLD-MCNC: 12.3 G/DL (ref 11.7–15.7)
LDLC SERPL CALC-MCNC: 71 MG/DL
MCH RBC QN AUTO: 28.9 PG (ref 26.5–33)
MCHC RBC AUTO-ENTMCNC: 34.1 G/DL (ref 31.5–36.5)
MCV RBC AUTO: 85 FL (ref 78–100)
NONHDLC SERPL-MCNC: 86 MG/DL
PLATELET # BLD AUTO: 344 10E3/UL (ref 150–450)
POTASSIUM SERPL-SCNC: 4.1 MMOL/L (ref 3.4–5.3)
PROT SERPL-MCNC: 7.3 G/DL (ref 6.4–8.3)
RBC # BLD AUTO: 4.26 10E6/UL (ref 3.8–5.2)
SODIUM SERPL-SCNC: 137 MMOL/L (ref 135–145)
TRIGL SERPL-MCNC: 75 MG/DL
WBC # BLD AUTO: 5 10E3/UL (ref 4–11)

## 2024-05-01 PROCEDURE — 90746 HEPB VACCINE 3 DOSE ADULT IM: CPT | Performed by: PHYSICIAN ASSISTANT

## 2024-05-01 PROCEDURE — 83036 HEMOGLOBIN GLYCOSYLATED A1C: CPT | Performed by: PHYSICIAN ASSISTANT

## 2024-05-01 PROCEDURE — 99396 PREV VISIT EST AGE 40-64: CPT | Mod: 25 | Performed by: PHYSICIAN ASSISTANT

## 2024-05-01 PROCEDURE — 85027 COMPLETE CBC AUTOMATED: CPT | Performed by: PHYSICIAN ASSISTANT

## 2024-05-01 PROCEDURE — 90471 IMMUNIZATION ADMIN: CPT | Performed by: PHYSICIAN ASSISTANT

## 2024-05-01 PROCEDURE — 36415 COLL VENOUS BLD VENIPUNCTURE: CPT | Performed by: PHYSICIAN ASSISTANT

## 2024-05-01 PROCEDURE — 80061 LIPID PANEL: CPT | Performed by: PHYSICIAN ASSISTANT

## 2024-05-01 PROCEDURE — 80053 COMPREHEN METABOLIC PANEL: CPT | Performed by: PHYSICIAN ASSISTANT

## 2024-05-01 SDOH — HEALTH STABILITY: PHYSICAL HEALTH: ON AVERAGE, HOW MANY DAYS PER WEEK DO YOU ENGAGE IN MODERATE TO STRENUOUS EXERCISE (LIKE A BRISK WALK)?: 3 DAYS

## 2024-05-01 ASSESSMENT — LIFESTYLE VARIABLES
HOW MANY STANDARD DRINKS CONTAINING ALCOHOL DO YOU HAVE ON A TYPICAL DAY: 3 OR 4
HOW OFTEN DO YOU HAVE A DRINK CONTAINING ALCOHOL: 2-4 TIMES A MONTH

## 2024-05-01 ASSESSMENT — SOCIAL DETERMINANTS OF HEALTH (SDOH)
HOW OFTEN DO YOU GET TOGETHER WITH FRIENDS OR RELATIVES?: ONCE A WEEK
DO YOU BELONG TO ANY CLUBS OR ORGANIZATIONS SUCH AS CHURCH GROUPS UNIONS, FRATERNAL OR ATHLETIC GROUPS, OR SCHOOL GROUPS?: PATIENT DECLINED
HOW OFTEN DO YOU ATTENT MEETINGS OF THE CLUB OR ORGANIZATION YOU BELONG TO?: PATIENT DECLINED
IN A TYPICAL WEEK, HOW MANY TIMES DO YOU TALK ON THE PHONE WITH FAMILY, FRIENDS, OR NEIGHBORS?: ONCE A WEEK

## 2024-05-01 NOTE — PROGRESS NOTES
Preventive Care Visit  [facility]  Kiki Mariano PA-C, Family Medicine  May 1, 2024      Assessment & Plan     (Z00.00) Routine general medical examination at a health care facility  (primary encounter diagnosis)  Comment: non fasting. Await labs.   Plan: CBC with platelets, Comprehensive metabolic         panel (BMP + Alb, Alk Phos, ALT, AST, Total.         Bili, TP), Lipid panel reflex to direct LDL         Non-fasting, Hemoglobin A1c            Hep B #2 given           Counseling  Appropriate preventive services were discussed with this patient, including applicable screening as appropriate for fall prevention, nutrition, physical activity, Tobacco-use cessation, weight loss and cognition.  Checklist reviewing preventive services available has been given to the patient.  Reviewed patient's diet, addressing concerns and/or questions.   She is at risk for lack of exercise and has been provided with information to increase physical activity for the benefit of her well-being.   She is at risk for psychosocial distress and has been provided with information to reduce risk.       See Patient Instructions    Subjective   Citlali is a 48 year old, presenting for the following:  Physical (Routine visit)        5/1/2024    10:58 AM   Additional Questions   Roomed by matheus marquez        Health Care Directive  Patient does not have a Health Care Directive or Living Will: not discussed     HPI              5/1/2024   General Health   How would you rate your overall physical health? Good   Feel stress (tense, anxious, or unable to sleep) Only a little    Only a little   (!) STRESS CONCERN      5/1/2024   Nutrition   Three or more servings of calcium each day? Yes   Diet: Other   If other, please elaborate: protein shakes lihgt snack and normal dinner   How many servings of fruit and vegetables per day? (!) 0-1   How many sweetened beverages each day? 0-1         5/1/2024   Exercise   Days per  week of moderate/strenous exercise 3 days    3 days         5/1/2024   Social Factors   Frequency of gathering with friends or relatives Once a week   Worry food won't last until get money to buy more No    No   Food not last or not have enough money for food? No    No   Do you have housing?  Yes    Yes   Are you worried about losing your housing? No    No   Lack of transportation? No    No   Unable to get utilities (heat,electricity)? No    No         5/1/2024   Dental   Dentist two times every year? Yes         5/1/2024   TB Screening   Were you born outside of the [place]? No         Today's PHQ-2 Score:       5/1/2024    10:53 AM   PHQ-2 ( 1999 Pf[employer])   Q1: Little interest or pleasure in doing things 0   Q2: Feeling down, depressed or hopeless 0   PHQ-2 Score 0   Q1: Little interest or pleasure in doing things Not at all   Q2: Feeling down, depressed or hopeless Not at all   PHQ-2 Score 0           5/1/2024   Substance Use   Frequency of drinking alcohol? 2-4 times a month   Alcohol more than 3/day or more than 7/wk No   Do you use any other substances recreationally? No     Social History     Tobacco Use    Smoking status: Never    Smokeless tobacco: Never   Vaping Use    Vaping status: Never Used   Substance Use Topics    Alcohol use: Yes     Comment: socially    Drug use: No             5/1/2024   Breast Cancer Screening   Family history of breast, colon, or ovarian cancer? No / Unknown      Mammogram Screening - Mammogram every 1-2 years updated in Health Maintenance based on mutual decision making        5/1/2024   STI Screening   New sexual partner(s) since last STI/HIV test? No     History of abnormal Pap smear: NO - age 30-65 PAP every 5 years with negative HPV co-testing recommended       ASCVD Risk   The 10-year ASCVD risk score (Naomie MORRISON, et al., 2019) is: 0.6%    Values used to calculate the score:      Age: 48 years      Sex: Female      Is Non- : No      Diabetic: No    "   Tobacco smoker: No      Systolic Blood Pressure: 119 mmHg      Is BP treated: No      HDL Cholesterol: 50 mg/dL      Total Cholesterol: 136 mg/dL        5/1/2024   Contraception/Family Planning   Questions about contraception or family planning No        Reviewed and updated as needed this visit by Provider                    Past Medical History:   Diagnosis Date    Microscopic Hematuria     neg IVP 98 and neg US 97    Psoriasis     mild         Review of Systems  Constitutional, neuro, ENT, endocrine, pulmonary, cardiac, gastrointestinal, genitourinary, musculoskeletal, integument and psychiatric systems are negative, except as otherwise noted.     Objective    Exam  /85 (BP Location: Left arm, Patient Position: Sitting, Cuff Size: Adult Regular)   Pulse 77   Temp 98.8  F (37.1  C) (Oral)   Resp 12   Ht 1.626 m (5' 4\")   Wt 65.7 kg (144 lb 12.8 oz)   LMP 04/02/2024 (Approximate)   SpO2 97%   BMI 24.85 kg/m     Estimated body mass index is 24.85 kg/m  as calculated from the following:    Height as of this encounter: 1.626 m (5' 4\").    Weight as of this encounter: 65.7 kg (144 lb 12.8 oz).    Physical Exam  GENERAL: alert and no distress  EYES: Eyes grossly normal to inspection, PERRL and conjunctivae and sclerae normal  HENT: ear canals and TM's normal, nose and mouth without ulcers or lesions  NECK: no adenopathy, no asymmetry, masses, or scars  RESP: lungs clear to auscultation - no rales, rhonchi or wheezes  CV: regular rate and rhythm, normal S1 S2, no S3 or S4, no murmur, click or rub, no peripheral edema  ABDOMEN: soft, nontender, no hepatosplenomegaly, no masses and bowel sounds normal  MS: no gross musculoskeletal defects noted, no edema  SKIN: no suspicious lesions or rashes  NEURO: Normal strength and tone, mentation intact and speech normal  PSYCH: mentation appears normal, affect normal/bright        Signed Electronically by: Kiki Mariano PA-C    "

## 2024-05-01 NOTE — PATIENT INSTRUCTIONS
Preventive Care Advice   This is general advice given by our system to help you stay healthy. However, your care team may have specific advice just for you. Please talk to your care team about your preventive care needs.  Nutrition  Eat 5 or more servings of fruits and vegetables each day.  Try wheat bread, brown rice and whole grain pasta (instead of white bread, rice, and pasta).  Get enough calcium and vitamin D. Check the label on foods and aim for 100% of the RDA (recommended daily allowance).  Lifestyle  Exercise at least 150 minutes each week   (30 minutes a day, 5 days a week).  Do muscle strengthening activities 2 days a week. These help control your weight and prevent disease.  No smoking.  Wear sunscreen to prevent skin cancer.  Have a dental exam and cleaning every 6 months.  Yearly exams  See your health care team every year to talk about:  Any changes in your health.  Any medicines your care team has prescribed.  Preventive care, family planning, and ways to prevent chronic diseases.  Shots (vaccines)   HPV shots (up to age 26), if you've never had them before.  Hepatitis B shots (up to age 59), if you've never had them before.  COVID-19 shot: Get this shot when it's due.  Flu shot: Get a flu shot every year.  Tetanus shot: Get a tetanus shot every 10 years.  Pneumococcal, hepatitis A, and RSV shots: Ask your care team if you need these based on your risk.  Shingles shot (for age 50 and up).  General health tests  Diabetes screening:  Starting at age 35, Get screened for diabetes at least every 3 years.  If you are younger than age 35, ask your care team if you should be screened for diabetes.  Cholesterol test: At age 39, start having a cholesterol test every 5 years, or more often if advised.  Bone density scan (DEXA): At age 50, ask your care team if you should have this scan for osteoporosis (brittle bones).  Hepatitis C: Get tested at least once in your life.  STIs (sexually transmitted  infections)  Before age 24: Ask your care team if you should be screened for STIs.  After age 24: Get screened for STIs if you're at risk. You are at risk for STIs (including HIV) if:  You are sexually active with more than one person.  You don't use condoms every time.  You or a partner was diagnosed with a sexually transmitted infection.  If you are at risk for HIV, ask about PrEP medicine to prevent HIV.  Get tested for HIV at least once in your life, whether you are at risk for HIV or not.  Cancer screening tests  Cervical cancer screening: If you have a cervix, begin getting regular cervical cancer screening tests at age 21. Most people who have regular screenings with normal results can stop after age 65. Talk about this with your provider.  Breast cancer scan (mammogram): If you've ever had breasts, begin having regular mammograms starting at age 40. This is a scan to check for breast cancer.  Colon cancer screening: It is important to start screening for colon cancer at age 45.  Have a colonoscopy test every 10 years (or more often if you're at risk) Or, ask your provider about stool tests like a FIT test every year or Cologuard test every 3 years.  To learn more about your testing options, visit: https://www.Fort[employer]/974909.pdf.  For help making a decision, visit: https://bit.ly/pv14711.  Prostate cancer screening test: If you have a prostate and are age 55 to 69, ask your provider if you would benefit from a yearly prostate cancer screening test.  Lung cancer screening: If you are a current or former smoker age 50 to 80, ask your care team if ongoing lung cancer screenings are right for you.  For informational purposes only. Not to replace the advice of your health care provider. Copyright   2023 [employer]. All rights reserved. Clinically reviewed by the [facility] Transitions Program. [employer] 299001 - REV 01/24.    Learning About Stress  What is stress?     Stress is your  body's response to a hard situation. Your body can have a physical, emotional, or mental response. Stress is a fact of life for most people, and it affects everyone differently. What causes stress for you may not be stressful for someone else.  A lot of things can cause stress. You may feel stress when you go on a job interview, take a test, or run a race. This kind of short-term stress is normal and even useful. It can help you if you need to work hard or react quickly. For example, stress can help you finish an important job on time.  Long-term stress is caused by ongoing stressful situations or events. Examples of long-term stress include long-term health problems, ongoing problems at work, or conflicts in your family. Long-term stress can harm your health.  How does stress affect your health?  When you are stressed, your body responds as though you are in danger. It makes hormones that speed up your heart, make you breathe faster, and give you a burst of energy. This is called the fight-or-flight stress response. If the stress is over quickly, your body goes back to normal and no harm is done.  But if stress happens too often or lasts too long, it can have bad effects. Long-term stress can make you more likely to get sick, and it can make symptoms of some diseases worse. If you tense up when you are stressed, you may develop neck, shoulder, or low back pain. Stress is linked to high blood pressure and heart disease.  Stress also harms your emotional health. It can make you gutierrez, tense, or depressed. Your relationships may suffer, and you may not do well at work or school.  What can you do to manage stress?  You can try these things to help manage stress:   Do something active. Exercise or activity can help reduce stress. Walking is a great way to get started. Even everyday activities such as housecleaning or yard work can help.  Try yoga or lorenzo chi. These techniques combine exercise and meditation. You may need  some training at first to learn them.  Do something you enjoy. For example, listen to music or go to a movie. Practice your hobby or do volunteer work.  Meditate. This can help you relax, because you are not worrying about what happened before or what may happen in the future.  Do guided imagery. Imagine yourself in any setting that helps you feel calm. You can use online videos, books, or a teacher to guide you.  Do breathing exercises. For example:  From a standing position, bend forward from the waist with your knees slightly bent. Let your arms dangle close to the floor.  Breathe in slowly and deeply as you return to a standing position. Roll up slowly and lift your head last.  Hold your breath for just a few seconds in the standing position.  Breathe out slowly and bend forward from the waist.  Let your feelings out. Talk, laugh, cry, and express anger when you need to. Talking with supportive friends or family, a counselor, or a abdias leader about your feelings is a healthy way to relieve stress. Avoid discussing your feelings with people who make you feel worse.  Write. It may help to write about things that are bothering you. This helps you find out how much stress you feel and what is causing it. When you know this, you can find better ways to cope.  What can you do to prevent stress?  You might try some of these things to help prevent stress:  Manage your time. This helps you find time to do the things you want and need to do.  Get enough sleep. Your body recovers from the stresses of the day while you are sleeping.  Get support. Your family, friends, and community can make a difference in how you experience stress.  Limit your news feed. Avoid or limit time on social media or news that may make you feel stressed.  Do something active. Exercise or activity can help reduce stress. Walking is a great way to get started.  Where can you learn more?  Go to https://www.healthwise.net/patiented  Enter N032 in the  "search box to learn more about \"Learning About Stress.\"  Current as of: October 24, 2023               Content Version: 14.0    0753-2377 [employer].   Care instructions adapted under license by your healthcare professional. If you have questions about a medical condition or this instruction, always ask your healthcare professional. [employer] disclaims any warranty or liability for your use of this information.      "

## 2025-02-17 ENCOUNTER — LAB REQUISITION (OUTPATIENT)
Dept: LAB | Facility: CLINIC | Age: 50
End: 2025-02-17
Payer: COMMERCIAL

## 2025-02-17 DIAGNOSIS — N93.9 ABNORMAL UTERINE AND VAGINAL BLEEDING, UNSPECIFIED: ICD-10-CM

## 2025-02-17 LAB
BASOPHILS # BLD AUTO: 0.1 10E3/UL (ref 0–0.2)
BASOPHILS NFR BLD AUTO: 1 %
EOSINOPHIL # BLD AUTO: 0.1 10E3/UL (ref 0–0.7)
EOSINOPHIL NFR BLD AUTO: 1 %
ERYTHROCYTE [DISTWIDTH] IN BLOOD BY AUTOMATED COUNT: 12.7 % (ref 10–15)
EST. AVERAGE GLUCOSE BLD GHB EST-MCNC: 108 MG/DL
HBA1C MFR BLD: 5.4 %
HCG INTACT+B SERPL-ACNC: <1 MIU/ML
HCT VFR BLD AUTO: 36.8 % (ref 35–47)
HGB BLD-MCNC: 12.3 G/DL (ref 11.7–15.7)
IMM GRANULOCYTES # BLD: 0 10E3/UL
IMM GRANULOCYTES NFR BLD: 0 %
LYMPHOCYTES # BLD AUTO: 1 10E3/UL (ref 0.8–5.3)
LYMPHOCYTES NFR BLD AUTO: 19 %
MCH RBC QN AUTO: 29.1 PG (ref 26.5–33)
MCHC RBC AUTO-ENTMCNC: 33.4 G/DL (ref 31.5–36.5)
MCV RBC AUTO: 87 FL (ref 78–100)
MONOCYTES # BLD AUTO: 0.4 10E3/UL (ref 0–1.3)
MONOCYTES NFR BLD AUTO: 8 %
NEUTROPHILS # BLD AUTO: 3.9 10E3/UL (ref 1.6–8.3)
NEUTROPHILS NFR BLD AUTO: 70 %
NRBC # BLD AUTO: 0 10E3/UL
NRBC BLD AUTO-RTO: 0 /100
PLATELET # BLD AUTO: 298 10E3/UL (ref 150–450)
PROLACTIN SERPL 3RD IS-MCNC: 16 NG/ML (ref 5–23)
RBC # BLD AUTO: 4.23 10E6/UL (ref 3.8–5.2)
TSH SERPL DL<=0.005 MIU/L-ACNC: 2.23 UIU/ML (ref 0.3–4.2)
WBC # BLD AUTO: 5.5 10E3/UL (ref 4–11)

## 2025-02-17 PROCEDURE — 84146 ASSAY OF PROLACTIN: CPT | Performed by: OBSTETRICS & GYNECOLOGY

## 2025-02-17 PROCEDURE — 83036 HEMOGLOBIN GLYCOSYLATED A1C: CPT | Performed by: OBSTETRICS & GYNECOLOGY

## 2025-02-17 PROCEDURE — 85025 COMPLETE CBC W/AUTO DIFF WBC: CPT | Performed by: OBSTETRICS & GYNECOLOGY

## 2025-02-17 PROCEDURE — 85014 HEMATOCRIT: CPT | Performed by: OBSTETRICS & GYNECOLOGY

## 2025-02-17 PROCEDURE — 84702 CHORIONIC GONADOTROPIN TEST: CPT | Performed by: OBSTETRICS & GYNECOLOGY

## 2025-02-17 PROCEDURE — 84443 ASSAY THYROID STIM HORMONE: CPT | Performed by: OBSTETRICS & GYNECOLOGY

## 2025-02-25 ENCOUNTER — OFFICE VISIT (OUTPATIENT)
Dept: FAMILY MEDICINE | Facility: CLINIC | Age: 50
End: 2025-02-25
Payer: COMMERCIAL

## 2025-02-25 ENCOUNTER — NURSE TRIAGE (OUTPATIENT)
Dept: FAMILY MEDICINE | Facility: CLINIC | Age: 50
End: 2025-02-25

## 2025-02-25 VITALS
BODY MASS INDEX: 24.75 KG/M2 | TEMPERATURE: 98.2 F | WEIGHT: 145 LBS | DIASTOLIC BLOOD PRESSURE: 100 MMHG | SYSTOLIC BLOOD PRESSURE: 142 MMHG | HEIGHT: 64 IN | OXYGEN SATURATION: 100 % | HEART RATE: 75 BPM | RESPIRATION RATE: 20 BRPM

## 2025-02-25 DIAGNOSIS — R94.31 ABNORMAL ELECTROCARDIOGRAM: ICD-10-CM

## 2025-02-25 DIAGNOSIS — I10 BENIGN ESSENTIAL HYPERTENSION: Primary | ICD-10-CM

## 2025-02-25 PROCEDURE — 99215 OFFICE O/P EST HI 40 MIN: CPT | Performed by: PHYSICIAN ASSISTANT

## 2025-02-25 PROCEDURE — G2211 COMPLEX E/M VISIT ADD ON: HCPCS | Performed by: PHYSICIAN ASSISTANT

## 2025-02-25 PROCEDURE — 1126F AMNT PAIN NOTED NONE PRSNT: CPT | Performed by: PHYSICIAN ASSISTANT

## 2025-02-25 PROCEDURE — 3080F DIAST BP >= 90 MM HG: CPT | Performed by: PHYSICIAN ASSISTANT

## 2025-02-25 PROCEDURE — 93000 ELECTROCARDIOGRAM COMPLETE: CPT | Performed by: PHYSICIAN ASSISTANT

## 2025-02-25 PROCEDURE — 3077F SYST BP >= 140 MM HG: CPT | Performed by: PHYSICIAN ASSISTANT

## 2025-02-25 RX ORDER — LOSARTAN POTASSIUM 25 MG/1
25 TABLET ORAL DAILY
Qty: 30 TABLET | Refills: 1 | Status: SHIPPED | OUTPATIENT
Start: 2025-02-25

## 2025-02-25 ASSESSMENT — PAIN SCALES - GENERAL: PAINLEVEL_OUTOF10: NO PAIN (0)

## 2025-02-25 NOTE — PROGRESS NOTES
Assessment & Plan     Benign essential hypertension  Exam normal.  Does have family history of hypertension. Question whether the collagen could be contributing (Multi Collagen Burn supplement). Looking up the ingredients it appears unlikely this is contributing but worth stopping to see (ashwaganda and grains of paradise as well as the full ingredient list on [employer] database reviewed).  She will stop for a week to see if has improvement of blood pressure. Losartan sent to start (initially going to have her delay start a week but given abnormal EKG results she will start the losartan now).  I will reach out via [employer] in a week to see how her blood pressures have been off the collagen supplement and on losartan  Check BMP in about 1-2 weeks from starting the losartan. Ordered today.  Last CMP reviewed and normal thus not obtained as baseline today.  EKG obtained since unsure how long she has had the blood pressure elevation.  - EKG 12-lead complete w/read - Clinics  - Basic metabolic panel  (Ca, Cl, CO2, Creat, Gluc, K, Na, BUN); Future  - losartan (COZAAR) 25 MG tablet; Take 1 tablet (25 mg) by mouth daily.    Abnormal electrocardiogram   Enlarged left atria noted on EKG. I did reassure her that this is not the test to make this diagnosis but ECHO would be indicated to assess further.     Review of the result(s) of each unique test - CMP  Ordering of each unique test  Prescription drug management  44 minutes spent by me on the date of the encounter doing chart review, history and exam, documentation and further activities per the note    The longitudinal plan of care for the diagnosis(es)/condition(s) as documented were addressed during this visit. Due to the added complexity in care, I will continue to support Citlali in the subsequent management and with ongoing continuity of care.    Melba Godwin is a 49 year old, presenting for the following health issues:  Hypertension        2/25/2025     "10:41 AM   Additional Questions   Roomed by eunice alarcon   Accompanied by self     History of Present Illness       Reason for visit:  High blood pressure  Symptom onset:  1-2 weeks ago She is missing 2 dose(s) of medications per week.  She is not taking prescribed medications regularly due to remembering to take.       - Patient went to [facility] last week and was told her BP was high. Has started checking her BP. Hypertension runs in family.  She denies any chest pain or shortness of breath.  She has some headaches- unsure if related as she always has headaches and they are not any worse or different.          Objective    BP (!) 142/100   Pulse 75   Temp 98.2  F (36.8  C) (Temporal)   Resp 20   Ht 1.626 m (5' 4\")   Wt 65.8 kg (145 lb)   LMP 02/01/2025 (Exact Date)   SpO2 100%   BMI 24.89 kg/m    Body mass index is 24.89 kg/m .  Physical Exam   GENERAL: No acute distress  HEENT: Normocephalic  CARDIAC: Regular rate and rhythm. No murmurs.  PULMONARY: Lungs are clear to auscultation bilaterally. No wheezes, rhonchi or crackles.  NEURO: Alert and non-focal        Signed Electronically by: Jessica William PA-C    "

## 2025-02-25 NOTE — TELEPHONE ENCOUNTER
"S-(situation): RN received call from patient with concerns over high BP readings.     B-(background): Patient is not on blood pressure medication. Patient notes she has always had low BP. Patient notes recent stressor in the past week, 's company is not paying him his full paycheck amount.     Patient saw her OB/GYN last week, was told her BP was elevated. Patient had just run up 3 flights of stairs, so was unconcerned at reading.     Patient has home BP arm cuff (\"Hello Heart\"), started checking BP in the past few days. Patient checked BP last night, was 150/101, patient sat and rested and rechecked. Patient states the systolic has never been below 133 and diastolic has never been below 90 when she has checked her BP.     A-(assessment): Patient last checked BP now while on phone with RN, 159/111. Patient denies any chest pain. No difficulty breathing. No weakness. No numbness. No tingling. No headache now. Patient notes she has had headaches the past few days, had chalked it up to caffeine intake. Patient notes she had blurry vision while working on Saturday, this resolved after awhile on its own.     R-(recommendations): Per protocol, RN advised visit today. Scheduled for Office visit in open slot today 2/25/25 with Jessica William PA-C. Patient used to see Kiki Mariano and Susan Haase at , both have left clinic. Patient agreed with plan, no further questions at this time.         Reason for Disposition   Systolic BP >= 180 OR Diastolic >= 110    Additional Information   Negative: Patient sounds very sick or weak to the triager   Negative: Systolic BP >= 200 OR Diastolic >= 120 and having NO cardiac or neurologic symptoms   Negative: Pregnant 20 or more weeks (or postpartum < 6 weeks) with Systolic BP >= 140 OR Diastolic >= 90   Negative: Systolic BP >= 180 OR Diastolic >= 110, and missed most recent dose of blood pressure medication   Negative: Symptom is main concern (e.g., headache, chest pain)   " Negative: Low blood pressure is main concern   Negative: Systolic BP >= 160 OR Diastolic >= 100, and any cardiac (e.g., breathing difficulty, chest pain) or neurologic symptoms (e.g., new-onset blurred or double vision)   Negative: Pregnant 20 or more weeks (or postpartum < 6 weeks) with new hand or face swelling   Negative: Pregnant 20 or more weeks (or postpartum < 6 weeks) and Systolic BP >= 160 OR Diastolic >= 110   Negative: Sounds like a life-threatening emergency to the triager    Protocols used: Blood Pressure - High-A-OH      Amadou ISRAEL RN 2/25/2025 at 9:35 AM

## 2025-03-04 ENCOUNTER — MYC MEDICAL ADVICE (OUTPATIENT)
Dept: FAMILY MEDICINE | Facility: CLINIC | Age: 50
End: 2025-03-04
Payer: COMMERCIAL

## 2025-03-04 DIAGNOSIS — I10 BENIGN ESSENTIAL HYPERTENSION: Primary | ICD-10-CM

## 2025-03-04 RX ORDER — LOSARTAN POTASSIUM 50 MG/1
50 TABLET ORAL DAILY
Qty: 30 TABLET | Refills: 1 | Status: SHIPPED | OUTPATIENT
Start: 2025-03-04

## 2025-03-04 NOTE — TELEPHONE ENCOUNTER
Jessica- additional mychart message below.  Do you want to sed new sig?  Losartan RX was only for #30 2/25/25.  T'd up.  Please advise.  Ly Todd RN

## 2025-03-13 ENCOUNTER — HOSPITAL ENCOUNTER (OUTPATIENT)
Dept: CARDIOLOGY | Facility: CLINIC | Age: 50
Discharge: HOME | End: 2025-03-13
Attending: PHYSICIAN ASSISTANT
Payer: COMMERCIAL

## 2025-03-13 ENCOUNTER — LAB (OUTPATIENT)
Dept: LAB | Facility: CLINIC | Age: 50
End: 2025-03-13
Payer: COMMERCIAL

## 2025-03-13 ENCOUNTER — MYC MEDICAL ADVICE (OUTPATIENT)
Dept: FAMILY MEDICINE | Facility: CLINIC | Age: 50
End: 2025-03-13

## 2025-03-13 DIAGNOSIS — R94.31 ABNORMAL ELECTROCARDIOGRAM: ICD-10-CM

## 2025-03-13 DIAGNOSIS — I10 BENIGN ESSENTIAL HYPERTENSION: Primary | ICD-10-CM

## 2025-03-13 DIAGNOSIS — I10 BENIGN ESSENTIAL HYPERTENSION: ICD-10-CM

## 2025-03-13 LAB
ANION GAP SERPL CALCULATED.3IONS-SCNC: 10 MMOL/L (ref 7–15)
BUN SERPL-MCNC: 15.7 MG/DL (ref 6–20)
CALCIUM SERPL-MCNC: 10.1 MG/DL (ref 8.8–10.4)
CHLORIDE SERPL-SCNC: 106 MMOL/L (ref 98–107)
CREAT SERPL-MCNC: 0.74 MG/DL (ref 0.51–0.95)
EGFRCR SERPLBLD CKD-EPI 2021: >90 ML/MIN/1.73M2
GLUCOSE SERPL-MCNC: 100 MG/DL (ref 70–99)
HCO3 SERPL-SCNC: 25 MMOL/L (ref 22–29)
LVEF ECHO: NORMAL
POTASSIUM SERPL-SCNC: 4.3 MMOL/L (ref 3.4–5.3)
SODIUM SERPL-SCNC: 141 MMOL/L (ref 135–145)

## 2025-03-13 PROCEDURE — 93306 TTE W/DOPPLER COMPLETE: CPT

## 2025-03-13 NOTE — TELEPHONE ENCOUNTER
Jessica- see mychart message below.  Please advise.  Ly Todd, CONCETTA Nunes [place],     Ultrasound of the heart is normal. There are no signs of enlarged atria (upper chamber of the heart) or ventricle (lower chamber of the heart). In particular the left ventricle (which pumps the blood to the body is normal and working as expected. The ejection fraction is normal at 55-60%- this is the percentage of blood in the left ventricle that it pumps out when it pumps.  There is some thickening of the valve tissue noted on the mitral valve, tricuspid valve and aortic valve with a small amount of leaking from the mitral and tricuspid valve. At this time nothing further is needed for these.  Please let us know if you have any questions.     Thank you,  Jessica   Written by Jessica William PA-C on 3/13/2025 10:04 AM CDT  Seen by patient Citlali Adrian on 3/13/2025 10:11 AM

## 2025-03-14 NOTE — TELEPHONE ENCOUNTER
Attempt x 1. Called pt and left a message to call back to [phone] and to ask to speak to a nurse.     When pt calls back,     Relay message from provider below.     Patti CLEMENT RN   Clinic RN  [facility]

## 2025-03-14 NOTE — TELEPHONE ENCOUNTER
Jessica- see [employer] message below.  Do you want visit to discuss?  Please advise.  Ly Todd RN

## 2025-03-14 NOTE — TELEPHONE ENCOUNTER
Can we call patient to let her know that we can do calcium channel blocker (Amlodipine 5 mg once daily) vs losartan for blood pressure. We would want to do a nurse visit in two weeks to reassess blood pressure with the change in medication and office visit with provider in 4-6 weeks.     Thanks!  Montserrat Paniagua PA-C  (Covering for Jessica William PA-C)

## 2025-03-17 RX ORDER — AMLODIPINE BESYLATE 5 MG/1
5 TABLET ORAL DAILY
Qty: 30 TABLET | Refills: 1 | Status: SHIPPED | OUTPATIENT
Start: 2025-03-17

## 2025-04-09 ENCOUNTER — MYC MEDICAL ADVICE (OUTPATIENT)
Dept: FAMILY MEDICINE | Facility: CLINIC | Age: 50
End: 2025-04-09
Payer: COMMERCIAL

## 2025-06-04 ENCOUNTER — MYC MEDICAL ADVICE (OUTPATIENT)
Dept: FAMILY MEDICINE | Facility: CLINIC | Age: 50
End: 2025-06-04

## 2025-06-04 DIAGNOSIS — I10 BENIGN ESSENTIAL HYPERTENSION: ICD-10-CM

## 2025-06-04 RX ORDER — LOSARTAN POTASSIUM 50 MG/1
50 TABLET ORAL DAILY
Qty: 90 TABLET | Refills: 2 | Status: SHIPPED | OUTPATIENT
Start: 2025-06-04

## 2025-06-04 NOTE — TELEPHONE ENCOUNTER
"Routing message to Alicia.  See [employer] messaging.  Patient was scheduled with Jessica due to she called triage for high bp- see below.  Please address.  CONCETTA Stallings Jaime, RN      2/25/25  9:35 AM  Note  S-(situation): RN received call from patient with concerns over high BP readings.      B-(background): Patient is not on blood pressure medication. Patient notes she has always had low BP. Patient notes recent stressor in the past week, 's company is not paying him his full paycheck amount.      Patient saw her OB/GYN last week, was told her BP was elevated. Patient had just run up 3 flights of stairs, so was unconcerned at reading.      Patient has home BP arm cuff (\"Hello Heart\"), started checking BP in the past few days. Patient checked BP last night, was 150/101, patient sat and rested and rechecked. Patient states the systolic has never been below 133 and diastolic has never been below 90 when she has checked her BP.      A-(assessment): Patient last checked BP now while on phone with RN, 159/111. Patient denies any chest pain. No difficulty breathing. No weakness. No numbness. No tingling. No headache now. Patient notes she has had headaches the past few days, had chalked it up to caffeine intake. Patient notes she had blurry vision while working on Saturday, this resolved after awhile on its own.      R-(recommendations): Per protocol, RN advised visit today. Scheduled for Office visit in open slot today 2/25/25 with Jessica William PA-C. Patient used to see Kiki Mariano and Susan Haase at , both have left clinic. Patient agreed with plan, no further questions at this time.               "

## 2025-06-04 NOTE — TELEPHONE ENCOUNTER
Jessica- see mychart message below.  You only gave #30 with 2 refills.  Please advise.  Ly Todd RN

## 2025-07-24 ENCOUNTER — PATIENT OUTREACH (OUTPATIENT)
Dept: CARE COORDINATION | Facility: CLINIC | Age: 50
End: 2025-07-24
Payer: COMMERCIAL

## 2025-08-07 ENCOUNTER — PATIENT OUTREACH (OUTPATIENT)
Dept: CARE COORDINATION | Facility: CLINIC | Age: 50
End: 2025-08-07
Payer: COMMERCIAL